# Patient Record
Sex: FEMALE | Race: WHITE | Employment: OTHER | URBAN - NONMETROPOLITAN AREA
[De-identification: names, ages, dates, MRNs, and addresses within clinical notes are randomized per-mention and may not be internally consistent; named-entity substitution may affect disease eponyms.]

---

## 2017-03-03 ENCOUNTER — OFFICE VISIT - GICH (OUTPATIENT)
Dept: FAMILY MEDICINE | Facility: OTHER | Age: 72
End: 2017-03-03

## 2017-03-03 DIAGNOSIS — M70.62 TROCHANTERIC BURSITIS OF LEFT HIP: ICD-10-CM

## 2018-01-03 NOTE — NURSING NOTE
Patient Information     Patient Name MRN Sex Soraida Tamayo [id number] Female 1945      Nursing Note by An Crawford at 3/3/2017  1:15 PM     Author:  An Crawford Service:  (none) Author Type:  (none)     Filed:  3/3/2017  2:21 PM Encounter Date:  3/3/2017 Status:  Signed     :  An Crawford) hip pain X 1 month  An Crawford LPN........................3/3/2017  1:21 PM

## 2018-01-03 NOTE — PROGRESS NOTES
Patient Information     Patient Name MRN Sex Soraida Tamayo [id number] Female 1945      Progress Notes by Eric Balderas MD at 3/3/2017  1:15 PM     Author:  Eric Balderas MD Service:  (none) Author Type:  Physician     Filed:  3/3/2017  2:29 PM Encounter Date:  3/3/2017 Status:  Signed     :  Eric Balderas MD (Physician)            SUBJECTIVE:  Soraida Lynn is a 71 y.o. female here for left hip pain.  This is the first time I've seen this patient for this concern. Patient reports that she has had left hip pain over the last month. She does not recall any particular injury or activity that brought this on. She has a history of right foot pain and has been seen by podiatry for this. She states that she has pain when she is going up stairs and when she moves her leg in certain movements. She has tried Aleve occasionally at night with some relief.    ROS:    As above otherwise ROS is unremarkable.    OBJECTIVE:  /70  Pulse 72  Wt 72.6 kg (160 lb)  Breastfeeding? No  BMI 25.82 kg/m2    EXAM:  General Appearance: Pleasant, alert, appropriate appearance for age. No acute distress  Musculoskeletal: Left hip shows flexion of 120 , internal rotation 10 , external rotation 45 . She has tenderness over her greater trochanter. No SI joint tenderness. No pain with log rolling.  Skin: No rashes.    ASSESSEMENT AND PLAN:    Soraida was seen today for hip pain/problem.    Diagnoses and all orders for this visit:    Trochanteric bursitis, left hip  -     SD DRAIN/INJ MAJOR JOINT/BURSA HIP KNEE WO US GUIDE  -     triamcinolone acetonide 40 mg injection (KENALOG); Inject 1 mL intra-articular one time.    Her symptoms are consistent with trochanteric bursitis and possible mild IT band syndrome. Discussed her various options including daily anti-inflammatories over the next couple of weeks, physical therapy, steroid injections. She states that she has a [employer] tournament coming up soon and she would like to  have a steroid injection. Informed consent was obtained. Her left greater trochanter was cleansed normal fashion. A 5 mL mixture of 40 mg of Kenalog and lidocaine used to infiltrate the point of maximal tenderness in a fanlike fashion. She tolerated this well, no immediate Complications. She'll ice this area to prevent postinjection flare and follow-up as needed.      Jerry Balderas MD    This document was prepared using voice generated softwear.  While every attempt was made for accuracy, grammatical errors may exist.

## 2018-01-26 VITALS
WEIGHT: 160 LBS | DIASTOLIC BLOOD PRESSURE: 70 MMHG | BODY MASS INDEX: 25.82 KG/M2 | SYSTOLIC BLOOD PRESSURE: 112 MMHG | HEART RATE: 72 BPM

## 2018-02-02 ENCOUNTER — TRANSFERRED RECORDS (OUTPATIENT)
Dept: HEALTH INFORMATION MANAGEMENT | Facility: OTHER | Age: 73
End: 2018-02-02

## 2018-10-05 ENCOUNTER — TELEPHONE (OUTPATIENT)
Dept: FAMILY MEDICINE | Facility: OTHER | Age: 73
End: 2018-10-05

## 2018-10-05 ENCOUNTER — ALLIED HEALTH/NURSE VISIT (OUTPATIENT)
Dept: FAMILY MEDICINE | Facility: OTHER | Age: 73
End: 2018-10-05
Attending: INTERNAL MEDICINE
Payer: COMMERCIAL

## 2018-10-05 DIAGNOSIS — Z23 NEED FOR PROPHYLACTIC VACCINATION AND INOCULATION AGAINST INFLUENZA: Primary | ICD-10-CM

## 2018-10-05 PROCEDURE — 90662 IIV NO PRSV INCREASED AG IM: CPT

## 2018-10-05 PROCEDURE — G0008 ADMIN INFLUENZA VIRUS VAC: HCPCS

## 2018-10-05 PROCEDURE — 90471 IMMUNIZATION ADMIN: CPT

## 2018-10-05 NOTE — PROGRESS NOTES

## 2018-10-05 NOTE — LETTER
[address]   [address]            2018      Soraida Lynn  [address] [facility] [address]              Dear Ms. Lynn,      Thank you for your interest in becoming a [employer] user.     Please access the [employer] web site at [email or web address].     Your access code is:  [email or web address] - for Proxy Use  Activation Code Expiration: 1/3/2019 10:51 AM     If you allow your access code to , or if you have any questions please call the [employer] representative at your primary clinic during normal clinic hours.     Sincerely,  [facility]

## 2018-10-05 NOTE — MR AVS SNAPSHOT
"              After Visit Summary   10/5/2018    Soraida Lynn    MRN: [id number]           Patient Information     Date Of Birth          1945        Visit Information        Provider Department      10/5/2018 10:10 AM GH FLU [facility]        Today's Diagnoses     Need for prophylactic vaccination and inoculation against influenza    -  1       Follow-ups after your visit        Who to contact     If you have questions or need follow up information about today's clinic visit or your schedule please contact [facility] directly at [phone].  Normal or non-critical lab and imaging results will be communicated to you by Selectronhart, letter or phone within 4 business days after the clinic has received the results. If you do not hear from us within 7 days, please contact the clinic through Obviousideat or phone. If you have a critical or abnormal lab result, we will notify you by phone as soon as possible.  Submit refill requests through Verastem or call your pharmacy and they will forward the refill request to us. Please allow 3 business days for your refill to be completed.          Additional Information About Your Visit        MyChart Information     [employer] lets you send messages to your doctor, view your test results, renew your prescriptions, schedule appointments and more. To sign up, go to [email or web address] . Click on \"Log in\" on the left side of the screen, which will take you to the Welcome page. Then click on \"Sign up Now\" on the right side of the page.     You will be asked to enter the access code listed below, as well as some personal information. Please follow the directions to create your username and password.     Your access code is: [email or web address]  Expires: 1/3/2019 10:51 AM     Your access code will  in 90 days. If you need help or a new code, please call your [facility] or [phone].        Care EveryWhere ID     This is your Care " EveryWhere ID. This could be used by other organizations to access your [facility] medical records  [id number]         Blood Pressure from Last 3 Encounters:   03/03/17 112/70   10/11/16 126/68   12/29/15 116/70    Weight from Last 3 Encounters:   03/03/17 160 lb (72.6 kg)   10/11/16 160 lb (72.6 kg)   12/29/15 156 lb (70.8 kg)              We Performed the Following     HC FLU VACCINE, INCREASED ANTIGEN, PRESV FREE        Primary Care Provider Office Phone # Fax #    Eric Balderas [phone]       [address] [facility] [address]        Equal Access to Services     ROBERT SAMPSON : Hadkrystle Pedro, luiz brooks, qajohana kaalmajose enil, neelima aldridge . So [facility] [phone].    ATENCIÓN: Si habla español, tiene a salazar disposición servicios gratuitos de asistencia lingüística. Llame al [phone].    We comply with applicable federal civil rights laws and [place] laws. We do not discriminate on the basis of race, color, national origin, age, disability, sex, sexual orientation, or gender identity.            Thank you!     Thank you for choosing [facility] AND [facility]  for your care. Our goal is always to provide you with excellent care. Hearing back from our patients is one way we can continue to improve our services. Please take a few minutes to complete the written survey that you may receive in the mail after your visit with us. Thank you!             Your Updated Medication List - Protect others around you: Learn how to safely use, store and throw away your medicines at www.disposemymeds.org.      Notice  As of 10/5/2018  1:27 PM    You have not been prescribed any medications.

## 2018-10-24 ENCOUNTER — HOSPITAL ENCOUNTER (OUTPATIENT)
Dept: ULTRASOUND IMAGING | Facility: OTHER | Age: 73
Discharge: HOME | End: 2018-10-24
Attending: NURSE PRACTITIONER | Admitting: NURSE PRACTITIONER
Payer: COMMERCIAL

## 2018-10-24 ENCOUNTER — TRANSFERRED RECORDS (OUTPATIENT)
Dept: HEALTH INFORMATION MANAGEMENT | Facility: OTHER | Age: 73
End: 2018-10-24

## 2018-10-24 DIAGNOSIS — R60.9 SWELLING: ICD-10-CM

## 2018-10-24 DIAGNOSIS — M79.662 PAIN OF LEFT CALF: ICD-10-CM

## 2018-10-24 PROCEDURE — 93971 EXTREMITY STUDY: CPT | Mod: LT

## 2018-10-26 ENCOUNTER — TRANSFERRED RECORDS (OUTPATIENT)
Dept: HEALTH INFORMATION MANAGEMENT | Facility: OTHER | Age: 73
End: 2018-10-26

## 2018-11-09 ENCOUNTER — TRANSFERRED RECORDS (OUTPATIENT)
Dept: HEALTH INFORMATION MANAGEMENT | Facility: OTHER | Age: 73
End: 2018-11-09

## 2018-11-13 ENCOUNTER — HOSPITAL ENCOUNTER (OUTPATIENT)
Dept: MRI IMAGING | Facility: OTHER | Age: 73
Discharge: HOME | End: 2018-11-13
Attending: NURSE PRACTITIONER | Admitting: NURSE PRACTITIONER
Payer: COMMERCIAL

## 2018-11-13 DIAGNOSIS — M25.562 LEFT KNEE PAIN: ICD-10-CM

## 2018-11-13 PROCEDURE — 73721 MRI JNT OF LWR EXTRE W/O DYE: CPT | Mod: LT

## 2019-04-01 ENCOUNTER — DOCUMENTATION ONLY (OUTPATIENT)
Dept: OTHER | Facility: CLINIC | Age: 74
End: 2019-04-01

## 2019-10-23 ENCOUNTER — ALLIED HEALTH/NURSE VISIT (OUTPATIENT)
Dept: FAMILY MEDICINE | Facility: OTHER | Age: 74
End: 2019-10-23
Attending: FAMILY MEDICINE
Payer: COMMERCIAL

## 2019-10-23 DIAGNOSIS — Z23 NEED FOR PROPHYLACTIC VACCINATION AND INOCULATION AGAINST INFLUENZA: Primary | ICD-10-CM

## 2019-10-23 PROCEDURE — 90662 IIV NO PRSV INCREASED AG IM: CPT

## 2019-10-23 PROCEDURE — G0008 ADMIN INFLUENZA VIRUS VAC: HCPCS

## 2019-10-23 PROCEDURE — 90471 IMMUNIZATION ADMIN: CPT

## 2019-10-23 NOTE — NURSING NOTE
Immunization Documentation    Prior to Immunization administration, verified patients identity using patient's name and date of birth. Please see IMMUNIZATIONS  and order for additional information.  Patient / Parent instructed to remain in clinic for 15 minutes and report any adverse reaction to staff immediately.    Was entire vial of medication used? Yes  Vial/Syringe: Aisha Katz LPN  10/23/2019   4:15 PM

## 2019-12-12 ENCOUNTER — TRANSFERRED RECORDS (OUTPATIENT)
Dept: HEALTH INFORMATION MANAGEMENT | Facility: HOSPITAL | Age: 74
End: 2019-12-12

## 2020-02-03 ENCOUNTER — OFFICE VISIT (OUTPATIENT)
Dept: FAMILY MEDICINE | Facility: OTHER | Age: 75
End: 2020-02-03
Attending: FAMILY MEDICINE
Payer: COMMERCIAL

## 2020-02-03 VITALS
BODY MASS INDEX: 24.53 KG/M2 | DIASTOLIC BLOOD PRESSURE: 62 MMHG | HEART RATE: 74 BPM | OXYGEN SATURATION: 97 % | HEIGHT: 66 IN | RESPIRATION RATE: 20 BRPM | TEMPERATURE: 98.4 F | WEIGHT: 152.6 LBS | SYSTOLIC BLOOD PRESSURE: 110 MMHG

## 2020-02-03 DIAGNOSIS — F17.210 CIGARETTE NICOTINE DEPENDENCE WITHOUT COMPLICATION: ICD-10-CM

## 2020-02-03 DIAGNOSIS — Z01.818 PRE-OPERATIVE GENERAL PHYSICAL EXAMINATION: Primary | ICD-10-CM

## 2020-02-03 DIAGNOSIS — Z83.49 FAMILY HISTORY OF THYROID DYSFUNCTION: ICD-10-CM

## 2020-02-03 DIAGNOSIS — Z13.220 SCREENING CHOLESTEROL LEVEL: ICD-10-CM

## 2020-02-03 LAB
ALBUMIN SERPL-MCNC: 4.1 G/DL (ref 3.5–5.7)
ALP SERPL-CCNC: 55 U/L (ref 34–104)
ALT SERPL W P-5'-P-CCNC: 13 U/L (ref 7–52)
ANION GAP SERPL CALCULATED.3IONS-SCNC: 7 MMOL/L (ref 3–14)
AST SERPL W P-5'-P-CCNC: 14 U/L (ref 13–39)
BILIRUB SERPL-MCNC: 0.4 MG/DL (ref 0.3–1)
BUN SERPL-MCNC: 15 MG/DL (ref 7–25)
CALCIUM SERPL-MCNC: 9.3 MG/DL (ref 8.6–10.3)
CHLORIDE SERPL-SCNC: 101 MMOL/L (ref 98–107)
CHOLEST SERPL-MCNC: 297 MG/DL
CO2 SERPL-SCNC: 30 MMOL/L (ref 21–31)
CREAT SERPL-MCNC: 0.89 MG/DL (ref 0.6–1.2)
GFR SERPL CREATININE-BSD FRML MDRD: 62 ML/MIN/{1.73_M2}
GLUCOSE SERPL-MCNC: 109 MG/DL (ref 70–105)
HDLC SERPL-MCNC: 47 MG/DL (ref 23–92)
LDLC SERPL CALC-MCNC: 188 MG/DL
NONHDLC SERPL-MCNC: 250 MG/DL
POTASSIUM SERPL-SCNC: 4.4 MMOL/L (ref 3.5–5.1)
PROT SERPL-MCNC: 6.8 G/DL (ref 6.4–8.9)
SODIUM SERPL-SCNC: 138 MMOL/L (ref 134–144)
TRIGL SERPL-MCNC: 310 MG/DL
TSH SERPL DL<=0.05 MIU/L-ACNC: 0.56 IU/ML (ref 0.34–5.6)

## 2020-02-03 PROCEDURE — 90670 PCV13 VACCINE IM: CPT

## 2020-02-03 PROCEDURE — G0463 HOSPITAL OUTPT CLINIC VISIT: HCPCS | Mod: 25

## 2020-02-03 PROCEDURE — 80053 COMPREHEN METABOLIC PANEL: CPT | Mod: ZL | Performed by: FAMILY MEDICINE

## 2020-02-03 PROCEDURE — G0463 HOSPITAL OUTPT CLINIC VISIT: HCPCS

## 2020-02-03 PROCEDURE — 36415 COLL VENOUS BLD VENIPUNCTURE: CPT | Mod: ZL | Performed by: FAMILY MEDICINE

## 2020-02-03 PROCEDURE — 99214 OFFICE O/P EST MOD 30 MIN: CPT | Performed by: FAMILY MEDICINE

## 2020-02-03 PROCEDURE — 80061 LIPID PANEL: CPT | Mod: ZL | Performed by: FAMILY MEDICINE

## 2020-02-03 PROCEDURE — 84443 ASSAY THYROID STIM HORMONE: CPT | Mod: ZL,GZ | Performed by: FAMILY MEDICINE

## 2020-02-03 PROCEDURE — G0009 ADMIN PNEUMOCOCCAL VACCINE: HCPCS

## 2020-02-03 RX ORDER — MULTIVIT WITH MINERALS/LUTEIN
1 TABLET ORAL DAILY
COMMUNITY

## 2020-02-03 ASSESSMENT — MIFFLIN-ST. JEOR: SCORE: 1198.94

## 2020-02-03 ASSESSMENT — PAIN SCALES - GENERAL: PAINLEVEL: NO PAIN (0)

## 2020-02-03 ASSESSMENT — PATIENT HEALTH QUESTIONNAIRE - PHQ9: SUM OF ALL RESPONSES TO PHQ QUESTIONS 1-9: 0

## 2020-02-03 NOTE — NURSING NOTE
"Patient presents today for pre op exam.  Date of Surgery: 02/24/2020    Type of Surgery: Right Cataract Surgery  Surgeon: Dr. Black  Hospital:  [facility]  Fax:     Fever/Chills or other infectious symptoms in past month: no  >10lb weight loss in past two months: no  O2 SAT: 97    Health Care Directive/Code status:  yes  Hx of blood transfusions:   no  Td up to date:  yes  History of VRE/MRSA:  no Date:      Preoperative Evaluation: Obstructive Sleep Apnea screening    S: Snore -  Do you snore loudly? (louder than talking or loud enough to be heard through closed doors)yes  T: Tired - Do you often feel tired, fatigued, or sleepy during the daytime?no  O: Observed - Has anyone ever observed you stop breathing during your sleep?yes  P: Pressure - Do you have or are you being treated for high blood pressure?no  B: BMI - BMI greater than 35kg/m2?no  A: Age - Age over 50 years old?yes  N: Neck - Neck circumference greater than 40 cm?no  G: Gender - Gender: Male?no    Total number of \"YES\" responses:  3    Scoring: Low risk of JOHANNA 0-2  At Risk of JOHANNA: >3 High Risk of JOHANNA: 5-8    Deandra Wan LPN 2/3/2020 10:58 AM    Medication Reconciliation Complete    Deandra Wan LPN  2/3/2020 10:58 AM  "

## 2020-02-03 NOTE — H&P (VIEW-ONLY)
SUBJECTIVE:  Soraida Lynn is a 74 year old female here for preop.  She is planning on having a right cataract removed.  She is had a left cataract removed previously without any difficulty.    She continues to smoke daily essentially quitting.    She takes a Immanuel back and body each morning and Aleve PM at night.  She also takes a Centrum Silver.  She otherwise has no new concerns today.      Patient Active Problem List    Diagnosis Date Noted     Seborrheic keratosis 07/12/2011     Priority: Medium     Nicotine addiction 11/29/2010     Priority: Medium       Past Medical History:   Diagnosis Date     Closed fracture of skull (H)     Fractured skull as a child       Past Surgical History:   Procedure Laterality Date     ARTHROSCOPY KNEE Right 06/18/2014    meniscus tear     ARTHROSCOPY SHOULDER ROTATOR CUFF REPAIR Right 03/2012    arthroscopy, rotator cuff repair, SLAP lesion     EXTRACAPSULAR CATARACT EXTRATION WITH INTRAOCULAR LENS IMPLANT  2010    planned left cataract removal, Dr. Blcak     WRIST SURGERY Right 07/2011    Right wrist cystectomy - Dr Aguilar       Current Outpatient Medications   Medication Sig Dispense Refill     Aspirin-Caffeine (IMMANUEL BACK & BODY) 500-32.5 MG TABS        Ibuprofen-diphenhydrAMINE Cit (ADVIL PM) 200-38 MG TABS Take 2 tablets by mouth       multivitamin (CENTRUM SILVER) tablet Take 1 tablet by mouth daily         Allergies:  No Known Allergies    Family History   Problem Relation Age of Onset     Pancreatic Cancer Mother         Cancer-pancreatic     Other - See Comments Father         AAA/high blood pressure     Cancer Father         Cancer,skin cancer on his nose     Heart Disease Brother         Heart Disease,afib       Social History     Tobacco Use     Smoking status: Current Every Day Smoker     Packs/day: 1.00     Types: Cigarettes     Smokeless tobacco: Never Used   Substance Use Topics     Alcohol use: Yes     Drug use: Never       ROS:    As above otherwise ROS is  "unremarkable.      OBJECTIVE:  /62   Pulse 74   Temp 98.4  F (36.9  C)   Resp 20   Ht 1.676 m (5' 6\")   Wt 69.2 kg (152 lb 9.6 oz)   SpO2 97%   BMI 24.63 kg/m      EXAM:  General Appearance: Pleasant, alert, appropriate appearance for age. No acute distress  Head: Normal. Normocephalic, atraumatic.  Eyes: PERRL, EOMI  Ears: Normal TM's bilaterally. Normal auditory canals and external ears.   OroPharynx: Upper and lower dentures.  Normal buccal mucosa. Normal pharynx.  Neck: Supple, no masses or nodes, no lymphadenopathy.  No thyromegaly.  Lungs: Normal chest wall and respirations. Clear to auscultation, no wheezes or crackles.  Cardiovascular: Regular rate and rhythm. S1, S2, no murmurs.  Gastrointestinal: Soft, nontender, no abnormal masses or organomegaly. BS normal.  Musculoskeletal: No edema.  Skin: no concerning or new rashes.  Numerous seborrheic keratoses are seen once again.  Neurologic Exam: CN 2-12 grossly intact.  Normal gait.   Psychiatric Exam: Alert and oriented, appropriate affect.    ASSESSEMENT AND PLAN:    1. Pre-operative general physical examination    2. Cigarette nicotine dependence without complication    3. Screening cholesterol level      She is cleared for upcoming procedure.  She will stop her aspirin and ibuprofen 1 week prior to her procedure.  She is otherwise optimized.    We will get labs today, she is not fasting.    We will get Prevnar today.    Jerry Balderas MD  Family Medicine      This document was prepared using voice generated software.  While every attempt was made for accuracy, grammatical errors may exist.  "

## 2020-02-03 NOTE — PROGRESS NOTES
SUBJECTIVE:  Soraida Lynn is a 74 year old female here for preop.  She is planning on having a right cataract removed.  She is had a left cataract removed previously without any difficulty.    She continues to smoke daily essentially quitting.    She takes a Immanuel back and body each morning and Aleve PM at night.  She also takes a Centrum Silver.  She otherwise has no new concerns today.      Patient Active Problem List    Diagnosis Date Noted     Seborrheic keratosis 07/12/2011     Priority: Medium     Nicotine addiction 11/29/2010     Priority: Medium       Past Medical History:   Diagnosis Date     Closed fracture of skull (H)     Fractured skull as a child       Past Surgical History:   Procedure Laterality Date     ARTHROSCOPY KNEE Right 06/18/2014    meniscus tear     ARTHROSCOPY SHOULDER ROTATOR CUFF REPAIR Right 03/2012    arthroscopy, rotator cuff repair, SLAP lesion     EXTRACAPSULAR CATARACT EXTRATION WITH INTRAOCULAR LENS IMPLANT  2010    planned left cataract removal, Dr. Black     WRIST SURGERY Right 07/2011    Right wrist cystectomy - Dr Aguilar       Current Outpatient Medications   Medication Sig Dispense Refill     Aspirin-Caffeine (IMMANUEL BACK & BODY) 500-32.5 MG TABS        Ibuprofen-diphenhydrAMINE Cit (ADVIL PM) 200-38 MG TABS Take 2 tablets by mouth       multivitamin (CENTRUM SILVER) tablet Take 1 tablet by mouth daily         Allergies:  No Known Allergies    Family History   Problem Relation Age of Onset     Pancreatic Cancer Mother         Cancer-pancreatic     Other - See Comments Father         AAA/high blood pressure     Cancer Father         Cancer,skin cancer on his nose     Heart Disease Brother         Heart Disease,afib       Social History     Tobacco Use     Smoking status: Current Every Day Smoker     Packs/day: 1.00     Types: Cigarettes     Smokeless tobacco: Never Used   Substance Use Topics     Alcohol use: Yes     Drug use: Never       ROS:    As above otherwise ROS is  "unremarkable.      OBJECTIVE:  /62   Pulse 74   Temp 98.4  F (36.9  C)   Resp 20   Ht 1.676 m (5' 6\")   Wt 69.2 kg (152 lb 9.6 oz)   SpO2 97%   BMI 24.63 kg/m      EXAM:  General Appearance: Pleasant, alert, appropriate appearance for age. No acute distress  Head: Normal. Normocephalic, atraumatic.  Eyes: PERRL, EOMI  Ears: Normal TM's bilaterally. Normal auditory canals and external ears.   OroPharynx: Upper and lower dentures.  Normal buccal mucosa. Normal pharynx.  Neck: Supple, no masses or nodes, no lymphadenopathy.  No thyromegaly.  Lungs: Normal chest wall and respirations. Clear to auscultation, no wheezes or crackles.  Cardiovascular: Regular rate and rhythm. S1, S2, no murmurs.  Gastrointestinal: Soft, nontender, no abnormal masses or organomegaly. BS normal.  Musculoskeletal: No edema.  Skin: no concerning or new rashes.  Numerous seborrheic keratoses are seen once again.  Neurologic Exam: CN 2-12 grossly intact.  Normal gait.   Psychiatric Exam: Alert and oriented, appropriate affect.    ASSESSEMENT AND PLAN:    1. Pre-operative general physical examination    2. Cigarette nicotine dependence without complication    3. Screening cholesterol level      She is cleared for upcoming procedure.  She will stop her aspirin and ibuprofen 1 week prior to her procedure.  She is otherwise optimized.    We will get labs today, she is not fasting.    We will get Prevnar today.    Jerry Balderas MD  Family Medicine      This document was prepared using voice generated software.  While every attempt was made for accuracy, grammatical errors may exist.  "

## 2020-02-17 ENCOUNTER — ANESTHESIA EVENT (OUTPATIENT)
Dept: SURGERY | Facility: HOSPITAL | Age: 75
End: 2020-02-17
Payer: COMMERCIAL

## 2020-02-17 ASSESSMENT — LIFESTYLE VARIABLES: TOBACCO_USE: 1

## 2020-02-17 NOTE — ANESTHESIA PREPROCEDURE EVALUATION
Anesthesia Pre-Procedure Evaluation    Patient: Soraida Lynn   MRN: [id number] : 1945          Preoperative Diagnosis: Combined forms of age-related cataract of right eye [H25.811]    Procedure(s):  PHACOEMULSIFICATION CATARACT EXTRACTION POSTERIOR CHAMBER LENS RIGHT EYE    Past Medical History:   Diagnosis Date     Closed fracture of skull (H)     Fractured skull as a child     Past Surgical History:   Procedure Laterality Date     ARTHROSCOPY KNEE Right 2014    meniscus tear     ARTHROSCOPY SHOULDER ROTATOR CUFF REPAIR Right 2012    arthroscopy, rotator cuff repair, SLAP lesion     EXTRACAPSULAR CATARACT EXTRATION WITH INTRAOCULAR LENS IMPLANT      planned left cataract removal, Dr. Black     WRIST SURGERY Right 2011    Right wrist cystectomy - Dr Aguilar       Anesthesia Evaluation     . Pt has had prior anesthetic. Type: General and MAC    No history of anesthetic complications          ROS/MED HX    ENT/Pulmonary:     (+)tobacco use, Current use 1 packs/day  , . .    Neurologic:  - neg neurologic ROS     Cardiovascular:     (+) Dyslipidemia, ----. : . . . :. .       METS/Exercise Tolerance:     Hematologic:  - neg hematologic  ROS       Musculoskeletal:   (+) arthritis,  -       GI/Hepatic:  - neg GI/hepatic ROS       Renal/Genitourinary:  - ROS Renal section negative       Endo:  - neg endo ROS       Psychiatric:  - neg psychiatric ROS       Infectious Disease:  - neg infectious disease ROS      (-) HIV   Malignancy:      - no malignancy   Other:    - neg other ROS                      Physical Exam  Normal systems: cardiovascular and pulmonary    Airway   Mallampati: II  TM distance: >3 FB  Neck ROM: full    Dental   (+) upper dentures and lower dentures    Cardiovascular   Rhythm and rate: regular and normal      Pulmonary    breath sounds clear to auscultation            Lab Results   Component Value Date    HGB 15.0 2014    HCT 44.7 2013  "    02/18/2013    SED 13 02/18/2013     02/03/2020    POTASSIUM 4.4 02/03/2020    CHLORIDE 101 02/03/2020    CO2 30 02/03/2020    BUN 15 02/03/2020    CR 0.89 02/03/2020     (H) 02/03/2020    NIRAV 9.3 02/03/2020    ALBUMIN 4.1 02/03/2020    PROTTOTAL 6.8 02/03/2020    ALT 13 02/03/2020    AST 14 02/03/2020    ALKPHOS 55 02/03/2020    BILITOTAL 0.4 02/03/2020       Preop Vitals  BP Readings from Last 3 Encounters:   02/03/20 110/62   03/03/17 112/70   10/11/16 126/68    Pulse Readings from Last 3 Encounters:   02/03/20 74   03/03/17 72   10/11/16 72      Resp Readings from Last 3 Encounters:   02/03/20 20    SpO2 Readings from Last 3 Encounters:   02/03/20 97%      Temp Readings from Last 1 Encounters:   02/03/20 98.4  F (36.9  C)    Ht Readings from Last 1 Encounters:   02/03/20 1.676 m (5' 6\")      Wt Readings from Last 1 Encounters:   02/03/20 69.2 kg (152 lb 9.6 oz)    Estimated body mass index is 24.63 kg/m  as calculated from the following:    Height as of 2/3/20: 1.676 m (5' 6\").    Weight as of 2/3/20: 69.2 kg (152 lb 9.6 oz).       Anesthesia Plan      History & Physical Review  History and physical reviewed and following examination; no interval change.    ASA Status:  2 .    NPO Status:  > 8 hours    Plan for MAC and Other with Other induction. Maintenance will be Other.  Reason for MAC:  Procedure to face, neck, head or breast    Hp 2/3/20        Postoperative Care      Consents  Anesthetic plan, risks, benefits and alternatives discussed with:  Patient.  Use of blood products discussed: No .   .                 SHIRIN Antonio CRNA  "

## 2020-02-24 ENCOUNTER — HOSPITAL ENCOUNTER (OUTPATIENT)
Facility: HOSPITAL | Age: 75
Discharge: HOME | End: 2020-02-24
Attending: OPHTHALMOLOGY | Admitting: OPHTHALMOLOGY
Payer: COMMERCIAL

## 2020-02-24 ENCOUNTER — ANESTHESIA (OUTPATIENT)
Dept: SURGERY | Facility: HOSPITAL | Age: 75
End: 2020-02-24
Payer: COMMERCIAL

## 2020-02-24 VITALS
OXYGEN SATURATION: 97 % | RESPIRATION RATE: 16 BRPM | DIASTOLIC BLOOD PRESSURE: 66 MMHG | SYSTOLIC BLOOD PRESSURE: 115 MMHG | TEMPERATURE: 98.4 F | HEART RATE: 73 BPM

## 2020-02-24 PROCEDURE — V2632 POST CHMBR INTRAOCULAR LENS: HCPCS | Performed by: OPHTHALMOLOGY

## 2020-02-24 PROCEDURE — 25000125 ZZHC RX 250: Performed by: OPHTHALMOLOGY

## 2020-02-24 PROCEDURE — 25000132 ZZH RX MED GY IP 250 OP 250 PS 637: Performed by: OPHTHALMOLOGY

## 2020-02-24 PROCEDURE — 27210794 ZZH OR GENERAL SUPPLY STERILE: Performed by: OPHTHALMOLOGY

## 2020-02-24 PROCEDURE — 36000056 ZZH SURGERY LEVEL 3 1ST 30 MIN: Performed by: OPHTHALMOLOGY

## 2020-02-24 PROCEDURE — 66984 XCAPSL CTRC RMVL W/O ECP: CPT | Performed by: NURSE ANESTHETIST, CERTIFIED REGISTERED

## 2020-02-24 PROCEDURE — 40000306 ZZH STATISTIC PRE PROC ASSESS II: Performed by: OPHTHALMOLOGY

## 2020-02-24 PROCEDURE — 99100 ANES PT EXTEME AGE<1 YR&>70: CPT | Performed by: NURSE ANESTHETIST, CERTIFIED REGISTERED

## 2020-02-24 PROCEDURE — 25000128 H RX IP 250 OP 636: Performed by: OPHTHALMOLOGY

## 2020-02-24 PROCEDURE — 37000008 ZZH ANESTHESIA TECHNICAL FEE, 1ST 30 MIN: Performed by: OPHTHALMOLOGY

## 2020-02-24 PROCEDURE — 71000027 ZZH RECOVERY PHASE 2 EACH 15 MINS: Performed by: OPHTHALMOLOGY

## 2020-02-24 DEVICE — LENS-SOFPORT 17.5: Type: IMPLANTABLE DEVICE | Site: EYE | Status: FUNCTIONAL

## 2020-02-24 RX ORDER — MOXIFLOXACIN 5 MG/ML
SOLUTION/ DROPS OPHTHALMIC PRN
Status: DISCONTINUED | OUTPATIENT
Start: 2020-02-24 | End: 2020-02-24 | Stop reason: HOSPADM

## 2020-02-24 RX ORDER — ONDANSETRON 4 MG/1
4 TABLET, ORALLY DISINTEGRATING ORAL EVERY 30 MIN PRN
Status: DISCONTINUED | OUTPATIENT
Start: 2020-02-24 | End: 2020-02-24 | Stop reason: HOSPADM

## 2020-02-24 RX ORDER — LIDOCAINE HYDROCHLORIDE 10 MG/ML
INJECTION, SOLUTION EPIDURAL; INFILTRATION; INTRACAUDAL; PERINEURAL PRN
Status: DISCONTINUED | OUTPATIENT
Start: 2020-02-24 | End: 2020-02-24 | Stop reason: HOSPADM

## 2020-02-24 RX ORDER — MEPERIDINE HYDROCHLORIDE 50 MG/ML
12.5 INJECTION INTRAMUSCULAR; INTRAVENOUS; SUBCUTANEOUS
Status: DISCONTINUED | OUTPATIENT
Start: 2020-02-24 | End: 2020-02-24 | Stop reason: HOSPADM

## 2020-02-24 RX ORDER — NALOXONE HYDROCHLORIDE 0.4 MG/ML
.1-.4 INJECTION, SOLUTION INTRAMUSCULAR; INTRAVENOUS; SUBCUTANEOUS
Status: DISCONTINUED | OUTPATIENT
Start: 2020-02-24 | End: 2020-02-24 | Stop reason: HOSPADM

## 2020-02-24 RX ORDER — CYCLOPENTOLATE HYDROCHLORIDE 20 MG/ML
1 SOLUTION/ DROPS OPHTHALMIC
Status: COMPLETED | OUTPATIENT
Start: 2020-02-24 | End: 2020-02-24

## 2020-02-24 RX ORDER — PHENYLEPHRINE HYDROCHLORIDE 25 MG/ML
1 SOLUTION/ DROPS OPHTHALMIC
Status: COMPLETED | OUTPATIENT
Start: 2020-02-24 | End: 2020-02-24

## 2020-02-24 RX ORDER — PROPARACAINE HYDROCHLORIDE 5 MG/ML
1 SOLUTION/ DROPS OPHTHALMIC ONCE
Status: COMPLETED | OUTPATIENT
Start: 2020-02-24 | End: 2020-02-24

## 2020-02-24 RX ORDER — PILOCARPINE HYDROCHLORIDE 10 MG/ML
SOLUTION/ DROPS OPHTHALMIC PRN
Status: DISCONTINUED | OUTPATIENT
Start: 2020-02-24 | End: 2020-02-24 | Stop reason: HOSPADM

## 2020-02-24 RX ORDER — ACETAMINOPHEN 325 MG/1
650 TABLET ORAL ONCE
Status: COMPLETED | OUTPATIENT
Start: 2020-02-24 | End: 2020-02-24

## 2020-02-24 RX ORDER — TETRACAINE HYDROCHLORIDE 5 MG/ML
SOLUTION OPHTHALMIC PRN
Status: DISCONTINUED | OUTPATIENT
Start: 2020-02-24 | End: 2020-02-24 | Stop reason: HOSPADM

## 2020-02-24 RX ORDER — SODIUM CHLORIDE, SODIUM LACTATE, POTASSIUM CHLORIDE, CALCIUM CHLORIDE 600; 310; 30; 20 MG/100ML; MG/100ML; MG/100ML; MG/100ML
INJECTION, SOLUTION INTRAVENOUS CONTINUOUS
Status: DISCONTINUED | OUTPATIENT
Start: 2020-02-24 | End: 2020-02-24 | Stop reason: HOSPADM

## 2020-02-24 RX ORDER — ONDANSETRON 2 MG/ML
4 INJECTION INTRAMUSCULAR; INTRAVENOUS EVERY 30 MIN PRN
Status: DISCONTINUED | OUTPATIENT
Start: 2020-02-24 | End: 2020-02-24 | Stop reason: HOSPADM

## 2020-02-24 RX ORDER — LEVOBUNOLOL HYDROCHLORIDE 5 MG/ML
SOLUTION/ DROPS OPHTHALMIC PRN
Status: DISCONTINUED | OUTPATIENT
Start: 2020-02-24 | End: 2020-02-24 | Stop reason: HOSPADM

## 2020-02-24 RX ORDER — PHENYLEPHRINE HYDROCHLORIDE 100 MG/ML
1 SOLUTION/ DROPS OPHTHALMIC
Status: DISCONTINUED | OUTPATIENT
Start: 2020-02-24 | End: 2020-02-24 | Stop reason: HOSPADM

## 2020-02-24 RX ORDER — LIDOCAINE 40 MG/G
CREAM TOPICAL
Status: DISCONTINUED | OUTPATIENT
Start: 2020-02-24 | End: 2020-02-24 | Stop reason: HOSPADM

## 2020-02-24 RX ADMIN — PROPARACAINE HYDROCHLORIDE 1 DROP: 5 SOLUTION/ DROPS OPHTHALMIC at 07:26

## 2020-02-24 RX ADMIN — PHENYLEPHRINE HYDROCHLORIDE 1 DROP: 25 SOLUTION/ DROPS OPHTHALMIC at 07:27

## 2020-02-24 RX ADMIN — CYCLOPENTOLATE HYDROCHLORIDE 1 DROP: 20 SOLUTION/ DROPS OPHTHALMIC at 07:32

## 2020-02-24 RX ADMIN — FLURBIPROFEN SODIUM 0.5 ML: 0.3 SOLUTION/ DROPS OPHTHALMIC at 07:33

## 2020-02-24 RX ADMIN — ACETAMINOPHEN 650 MG: 325 TABLET, FILM COATED ORAL at 07:21

## 2020-02-24 RX ADMIN — PHENYLEPHRINE HYDROCHLORIDE 1 DROP: 25 SOLUTION/ DROPS OPHTHALMIC at 07:33

## 2020-02-24 RX ADMIN — CYCLOPENTOLATE HYDROCHLORIDE 1 DROP: 20 SOLUTION/ DROPS OPHTHALMIC at 07:27

## 2020-02-24 NOTE — INTERVAL H&P NOTE
History and physical reviewed. Patient examined. No interval change in condition.  Jorge Alberto Black MD

## 2020-02-24 NOTE — OP NOTE
[facility]  Ophthalmology Full Operative Note    Pre-operative diagnosis: Combined forms of age-related cataract of right eye [H25.811]   Post-operative diagnosis Same   Procedure: Procedure(s):  PHACOEMULSIFICATION CATARACT EXTRACTION POSTERIOR CHAMBER LENS RIGHT EYE LI61AO 17.5   Surgeon: Jorge Alberto Black MD   Assistants(s):    Anesthesia: Combined MAC with Topical    Estimated blood loss: None    Total IV fluids: (See anesthesia record)   Specimens: None   Implants: 17.5 LI61AO   Findings:    Complications: None   Condition: Stable   Comments:       PROCEDURAL ANESTHESIA:     Topical/MAC.  Lidocaine 2% jelly topically and Lidocaine 1% preservative-free intracamerally.     PROCEDURE:  The patient was brought to the Operating Room and prepped and draped in a sterile manner.  A wire lid speculum was placed.  A paracentesis was created and 1% Lidocaine was instilled in the anterior chamber.  The anterior chamber was then filled with Amvisc viscoelastic.  A clear cornea temporal wound was created using a 2.8 mm keratome.  A cystotome was used to initiate a flap in the anterior capsule and a Utrata forceps was used to create a continuous tear capsulorhexis.  Hydrodissection was performed.  The phacoemulsification tip was inserted into the eye and the nucleus and epinucleus were removed using a divide and conquer technique.  The residual cortex was removed using the I/A handpiece.  The anterior chamber was then refilled with viscoelastic and the wound was enlarged with the keratome.  The intraocular lens, 17.5 diopter, Model LI61AO, was placed into the injector and injected into the capsular bag. It was checked to make sure that it was central and stable.  Residual viscoelastic was removed using the I/A.  The anterior chamber was refilled with BSS.  The wounds were hydrated with BSS and were noted to be watertight with no suture necessary.  Topical pilocarpine 1%, Betagan, and Vigamox was applied.  A hard  shield was placed.     The patient tolerated the procedure well and was sent to the Recovery Room in satisfactory condition.

## 2020-02-24 NOTE — ANESTHESIA CARE TRANSFER NOTE
Patient: Soraida Lynn    Procedure(s):  PHACOEMULSIFICATION CATARACT EXTRACTION POSTERIOR CHAMBER LENS RIGHT EYE LI61AO 17.5    Diagnosis: Combined forms of age-related cataract of right eye [H25.811]  Diagnosis Additional Information: No value filed.    Anesthesia Type:   MAC, Other     Note:  Airway :Room Air  Patient transferred to:Phase II  Handoff Report: Identifed the Patient, Identified the Reponsible Provider, Reviewed the pertinent medical history, Discussed the surgical course, Reviewed Intra-OP anesthesia mangement and issues during anesthesia, Set expectations for post-procedure period and Allowed opportunity for questions and acknowledgement of understanding      Vitals: (Last set prior to Anesthesia Care Transfer)    CRNA VITALS  2/24/2020 0834 - 2/24/2020 0906      2/24/2020             Resp Rate (set):  8                Electronically Signed By: SHIRIN Mejía CRNA  February 24, 2020  9:06 AM

## 2020-03-11 ENCOUNTER — HEALTH MAINTENANCE LETTER (OUTPATIENT)
Age: 75
End: 2020-03-11

## 2020-12-27 ENCOUNTER — HEALTH MAINTENANCE LETTER (OUTPATIENT)
Age: 75
End: 2020-12-27

## 2021-02-01 ENCOUNTER — MYC MEDICAL ADVICE (OUTPATIENT)
Dept: FAMILY MEDICINE | Facility: OTHER | Age: 76
End: 2021-02-01

## 2021-02-09 ENCOUNTER — IMMUNIZATION (OUTPATIENT)
Dept: FAMILY MEDICINE | Facility: OTHER | Age: 76
End: 2021-02-09
Attending: FAMILY MEDICINE
Payer: COMMERCIAL

## 2021-02-09 PROCEDURE — 91300 PR COVID VAC PFIZER DIL RECON 30 MCG/0.3 ML IM: CPT

## 2021-03-02 ENCOUNTER — IMMUNIZATION (OUTPATIENT)
Dept: FAMILY MEDICINE | Facility: OTHER | Age: 76
End: 2021-03-02
Attending: FAMILY MEDICINE
Payer: COMMERCIAL

## 2021-03-02 PROCEDURE — 91300 PR COVID VAC PFIZER DIL RECON 30 MCG/0.3 ML IM: CPT

## 2021-04-25 ENCOUNTER — HEALTH MAINTENANCE LETTER (OUTPATIENT)
Age: 76
End: 2021-04-25

## 2021-10-09 ENCOUNTER — HEALTH MAINTENANCE LETTER (OUTPATIENT)
Age: 76
End: 2021-10-09

## 2021-11-11 ENCOUNTER — HOSPITAL ENCOUNTER (EMERGENCY)
Facility: HOSPITAL | Age: 76
Discharge: HOME | End: 2021-11-11
Attending: PHYSICIAN ASSISTANT | Admitting: PHYSICIAN ASSISTANT
Payer: COMMERCIAL

## 2021-11-11 VITALS
TEMPERATURE: 99.4 F | DIASTOLIC BLOOD PRESSURE: 84 MMHG | OXYGEN SATURATION: 97 % | HEART RATE: 95 BPM | SYSTOLIC BLOOD PRESSURE: 147 MMHG | RESPIRATION RATE: 16 BRPM

## 2021-11-11 DIAGNOSIS — B02.31 HERPES ZOSTER CONJUNCTIVITIS: ICD-10-CM

## 2021-11-11 PROCEDURE — G0463 HOSPITAL OUTPT CLINIC VISIT: HCPCS

## 2021-11-11 PROCEDURE — 99213 OFFICE O/P EST LOW 20 MIN: CPT | Performed by: PHYSICIAN ASSISTANT

## 2021-11-11 RX ORDER — VALACYCLOVIR HYDROCHLORIDE 1 G/1
1000 TABLET, FILM COATED ORAL 3 TIMES DAILY
Qty: 21 TABLET | Refills: 0 | Status: SHIPPED | OUTPATIENT
Start: 2021-11-11 | End: 2021-11-18

## 2021-11-11 RX ORDER — SULFAMETHOXAZOLE/TRIMETHOPRIM 800-160 MG
1 TABLET ORAL 2 TIMES DAILY
Qty: 10 TABLET | Refills: 0 | Status: SHIPPED | OUTPATIENT
Start: 2021-11-11 | End: 2021-11-16

## 2021-11-11 ASSESSMENT — ENCOUNTER SYMPTOMS
CONSTITUTIONAL NEGATIVE: 1
STRIDOR: 0
FACIAL SWELLING: 1
HEADACHES: 0
WHEEZING: 0
RESPIRATORY NEGATIVE: 1
TROUBLE SWALLOWING: 0
FEVER: 0
CHOKING: 0
EYE DISCHARGE: 0
VOMITING: 0
PHOTOPHOBIA: 0
CHILLS: 0
COUGH: 0
NAUSEA: 0
MUSCULOSKELETAL NEGATIVE: 1
EYE ITCHING: 0
SORE THROAT: 0
EYE PAIN: 0
LIGHT-HEADEDNESS: 0
SHORTNESS OF BREATH: 0
CARDIOVASCULAR NEGATIVE: 1
EYE REDNESS: 1

## 2021-11-11 ASSESSMENT — VISUAL ACUITY: OU: 1

## 2021-11-11 NOTE — ED PROVIDER NOTES
History     Chief Complaint   Patient presents with     Rash     HPI  Soraida Lynn is a 76 year old female on no medciations who presents with concern for shingles. Sx started as a solitary pimple over LT brow about 7 or-so days ago. Over the past 72 hrs, she has developed two more vesicles on her forehead and scalp. She notes tenderness to surrounding skin, but pain has been manageable. She denies fevers. Denies visual disturbance. Denies recent illness.     Allergies:  No Known Allergies    Problem List:    Patient Active Problem List    Diagnosis Date Noted     Seborrheic keratosis 07/12/2011     Priority: Medium     Nicotine addiction 11/29/2010     Priority: Medium        Past Medical History:    Past Medical History:   Diagnosis Date     Closed fracture of skull (H)        Past Surgical History:    Past Surgical History:   Procedure Laterality Date     ARTHROSCOPY KNEE Right 06/18/2014    meniscus tear     ARTHROSCOPY SHOULDER ROTATOR CUFF REPAIR Right 03/2012    arthroscopy, rotator cuff repair, SLAP lesion     EXTRACAPSULAR CATARACT EXTRATION WITH INTRAOCULAR LENS IMPLANT  2010    planned left cataract removal, Dr. Black     PHACOEMULSIFICATION WITH STANDARD INTRAOCULAR LENS IMPLANT Right 2/24/2020    Procedure: PHACOEMULSIFICATION CATARACT EXTRACTION POSTERIOR CHAMBER LENS RIGHT EYE LI61AO 17.5;  Surgeon: Jorge Alberto Black MD;  Location: [facility]     WRIST SURGERY Right 07/2011    Right wrist cystectomy - Dr Aguilar       Family History:    Family History   Problem Relation Age of Onset     Pancreatic Cancer Mother         Cancer-pancreatic     Other - See Comments Father         AAA/high blood pressure     Cancer Father         Cancer,skin cancer on his nose     Heart Disease Brother         Heart Disease,afib       Social History:  Marital Status:   [2]  Social History     Tobacco Use     Smoking status: Current Every Day Smoker     Packs/day: 1.00     Types: Cigarettes     Smokeless tobacco: Never  Used   Substance Use Topics     Alcohol use: Yes     Drug use: Never        Medications:    sulfamethoxazole-trimethoprim (BACTRIM DS) 800-160 MG tablet  valACYclovir (VALTREX) 1000 mg tablet  Aspirin-Caffeine (GILLES BACK & BODY) 500-32.5 MG TABS  Ibuprofen-diphenhydrAMINE Cit (ADVIL PM) 200-38 MG TABS  multivitamin (CENTRUM SILVER) tablet          Review of Systems   Constitutional: Negative.  Negative for chills and fever.   HENT: Positive for facial swelling. Negative for sore throat and trouble swallowing.    Eyes: Positive for redness. Negative for photophobia, pain, discharge, itching and visual disturbance.   Respiratory: Negative.  Negative for cough, choking, shortness of breath, wheezing and stridor.    Cardiovascular: Negative.    Gastrointestinal: Negative for nausea and vomiting.   Musculoskeletal: Negative.    Skin: Positive for rash.   Neurological: Negative for syncope, light-headedness and headaches.       Physical Exam   BP: 147/84  Pulse: 95  Temp: 99.4  F (37.4  C)  Resp: 16  SpO2: 97 %      Physical Exam  Vitals and nursing note reviewed.   Constitutional:       General: She is not in acute distress.     Appearance: She is not toxic-appearing.      Comments: Obvious erythema of forehead, obvious LT conjunctivitis and edema of lids of LT eye.   HENT:      Head: Normocephalic and atraumatic.        Right Ear: Tympanic membrane normal.      Left Ear: Tympanic membrane normal.      Nose: Nose normal.      Mouth/Throat:      Mouth: Mucous membranes are moist.   Eyes:      General: Lids are normal. Vision grossly intact. No visual field deficit.     Extraocular Movements: Extraocular movements intact.      Conjunctiva/sclera:      Left eye: Left conjunctiva is injected. No chemosis or exudate.     Pupils: Pupils are equal, round, and reactive to light.      Left eye: No fluorescein uptake.      Slit lamp exam:     Left eye: No corneal flare, corneal ulcer or photophobia.   Cardiovascular:      Rate  and Rhythm: Normal rate.   Pulmonary:      Effort: Pulmonary effort is normal.   Lymphadenopathy:      Cervical: No cervical adenopathy.   Neurological:      General: No focal deficit present.      Mental Status: She is alert and oriented to person, place, and time.         ED Course        Procedures      No results found for this or any previous visit (from the past 24 hour(s)).  Medications - No data to display    Assessments & Plan (with Medical Decision Making)     I have reviewed the nursing notes.  I have reviewed the findings, diagnosis, plan and need for follow up with the patient.    Discharge Medication List as of 11/11/2021 11:00 AM      START taking these medications    Details   sulfamethoxazole-trimethoprim (BACTRIM DS) 800-160 MG tablet Take 1 tablet by mouth 2 times daily for 5 days, Disp-10 tablet, R-0, E-Prescribe      valACYclovir (VALTREX) 1000 mg tablet Take 1 tablet (1,000 mg) by mouth 3 times daily for 7 days, Disp-21 tablet, R-0, E-Prescribe             Final diagnoses:   Herpes zoster conjunctivitis - will cover impetigo given appearance   Despite duration, will start valtrex and cover for secondary infection as above. No fluorescein uptake on exam, however I did recommend close f/u with Dr Martin's office for eye recheck. Pt will seek attention sooner with worsening or any loss/chane of vision.      11/11/2021   [facility]     Kyler Yee PA  11/11/21 5700

## 2021-11-11 NOTE — DISCHARGE INSTRUCTIONS
Valacyclovir for shingles virus (may upset stomach). Take for 7 days.   Bactrim to cover staph/skin bacteria. Take for 5 days.   Eye doc early next week - no findings impacting the cornea, just some conjunctivitis right now.   Back here/ER with any visual disturbance/worsening!

## 2022-05-21 ENCOUNTER — HEALTH MAINTENANCE LETTER (OUTPATIENT)
Age: 77
End: 2022-05-21

## 2022-09-17 ENCOUNTER — HEALTH MAINTENANCE LETTER (OUTPATIENT)
Age: 77
End: 2022-09-17

## 2023-06-04 ENCOUNTER — HEALTH MAINTENANCE LETTER (OUTPATIENT)
Age: 78
End: 2023-06-04

## 2023-07-17 ENCOUNTER — HOSPITAL ENCOUNTER (EMERGENCY)
Facility: HOSPITAL | Age: 78
Discharge: HOME | End: 2023-07-17
Attending: NURSE PRACTITIONER | Admitting: NURSE PRACTITIONER
Payer: COMMERCIAL

## 2023-07-17 VITALS
RESPIRATION RATE: 18 BRPM | OXYGEN SATURATION: 97 % | DIASTOLIC BLOOD PRESSURE: 84 MMHG | TEMPERATURE: 98.3 F | SYSTOLIC BLOOD PRESSURE: 156 MMHG | HEART RATE: 76 BPM

## 2023-07-17 DIAGNOSIS — A69.20 ERYTHEMA MIGRANS (LYME DISEASE): Primary | ICD-10-CM

## 2023-07-17 PROCEDURE — 99213 OFFICE O/P EST LOW 20 MIN: CPT | Performed by: NURSE PRACTITIONER

## 2023-07-17 PROCEDURE — G0463 HOSPITAL OUTPT CLINIC VISIT: HCPCS

## 2023-07-17 RX ORDER — DOXYCYCLINE 100 MG/1
100 CAPSULE ORAL 2 TIMES DAILY
Qty: 20 CAPSULE | Refills: 0 | Status: SHIPPED | OUTPATIENT
Start: 2023-07-17 | End: 2023-07-27

## 2023-07-17 ASSESSMENT — ENCOUNTER SYMPTOMS
SHORTNESS OF BREATH: 0
VOMITING: 0
DIARRHEA: 0
NAUSEA: 0
CHILLS: 0
WEAKNESS: 0
NECK STIFFNESS: 0
PSYCHIATRIC NEGATIVE: 1
FATIGUE: 0
HEADACHES: 0
MYALGIAS: 0
NECK PAIN: 0
ARTHRALGIAS: 0
DIZZINESS: 0
FEVER: 0
PHOTOPHOBIA: 0

## 2023-07-17 NOTE — DISCHARGE INSTRUCTIONS
Doxycycline as ordered  - Take entire course of antibiotic even if you start to feel better.  - Antibiotics can cause stomach upset including nausea and diarrhea. Read your bottle or ask the pharmacist if antibiotic can be taken with food to help prevent nausea. If you have symptoms of diarrhea you can take an over-the-counter probiotic and/or increase foods with probiotics such as yogurt, [place], sauerkraut.    Follow handout provided follow-up as needed.      Return to urgent care/ED with any worsening in condition or additional concerns.

## 2023-07-17 NOTE — ED PROVIDER NOTES
History     Chief Complaint   Patient presents with     Insect Bite     HPI  Sroaida Lynn is a 78 year old female who presents to urgent care today ambulatory with complaints of a deer tick bite to left lower extremity which is now accompanied by the start up of a erythema migrans rash.  Patient noticed tick yesterday, is a deer tick.  Not engorged.  Unsure when bite occurred.  Denies any fever, chills, nausea, vomiting, diarrhea, shortness of breath or chest pain.  Denies any headache, dizziness, fatigue, neck pain or stiffness, myalgia, arthralgia.  History of Lyme disease 20 years ago.  No other concerns.    Allergies:  No Known Allergies    Problem List:    Patient Active Problem List    Diagnosis Date Noted     Seborrheic keratosis 07/12/2011     Priority: Medium     Nicotine addiction 11/29/2010     Priority: Medium        Past Medical History:    Past Medical History:   Diagnosis Date     Closed fracture of skull (H)        Past Surgical History:    Past Surgical History:   Procedure Laterality Date     ARTHROSCOPY KNEE Right 06/18/2014    meniscus tear     ARTHROSCOPY SHOULDER ROTATOR CUFF REPAIR Right 03/2012    arthroscopy, rotator cuff repair, SLAP lesion     EXTRACAPSULAR CATARACT EXTRATION WITH INTRAOCULAR LENS IMPLANT  2010    planned left cataract removal, Dr. Black     PHACOEMULSIFICATION WITH STANDARD INTRAOCULAR LENS IMPLANT Right 2/24/2020    Procedure: PHACOEMULSIFICATION CATARACT EXTRACTION POSTERIOR CHAMBER LENS RIGHT EYE LI61AO 17.5;  Surgeon: Jorg eAlberto Black MD;  Location: [facility]     WRIST SURGERY Right 07/2011    Right wrist cystectomy - Dr Aguilar       Family History:    Family History   Problem Relation Age of Onset     Pancreatic Cancer Mother         Cancer-pancreatic     Other - See Comments Father         AAA/high blood pressure     Cancer Father         Cancer,skin cancer on his nose     Heart Disease Brother         Heart Disease,afib       Social History:  Marital Status:    [2]  Social History     Tobacco Use     Smoking status: Every Day     Packs/day: 1.00     Types: Cigarettes     Smokeless tobacco: Never   Substance Use Topics     Alcohol use: Yes     Drug use: Never        Medications:    Aspirin-Caffeine (GILLES BACK & BODY) 500-32.5 MG TABS  doxycycline hyclate (VIBRAMYCIN) 100 MG capsule  Ibuprofen-diphenhydrAMINE Cit (ADVIL PM) 200-38 MG TABS  multivitamin (CENTRUM SILVER) tablet  valACYclovir (VALTREX) 1000 mg tablet      Review of Systems   Constitutional: Negative for chills, fatigue and fever.   Eyes: Negative for photophobia and visual disturbance.   Respiratory: Negative for shortness of breath.    Cardiovascular: Negative for chest pain.   Gastrointestinal: Negative for diarrhea, nausea and vomiting.   Musculoskeletal: Negative for arthralgias, gait problem, myalgias, neck pain and neck stiffness.   Skin: Positive for rash (Start of erythema migrans rash to left lower extremity, no drainage.).   Neurological: Negative for dizziness, weakness and headaches.   Psychiatric/Behavioral: Negative.      Physical Exam   BP: 156/84  Pulse: 76  Temp: 98.3  F (36.8  C)  Resp: 18  SpO2: 97 %    Physical Exam  Vitals and nursing note reviewed.   Constitutional:       General: She is not in acute distress.     Appearance: Normal appearance. She is not ill-appearing or toxic-appearing.   Cardiovascular:      Rate and Rhythm: Normal rate and regular rhythm.      Pulses: Normal pulses.      Heart sounds: Normal heart sounds.   Pulmonary:      Effort: Pulmonary effort is normal.      Breath sounds: Normal breath sounds.   Skin:     General: Skin is warm and dry.      Capillary Refill: Capillary refill takes less than 2 seconds.      Findings: Rash (Start of erythema migrans rash to left lower extremity, no drainage or signs of infection.) present.   Neurological:      Mental Status: She is alert.   Psychiatric:         Mood and Affect: Mood normal.       ED Course     No results  found for this or any previous visit (from the past 24 hour(s)).    Medications - No data to display    Assessments & Plan (with Medical Decision Making)     I have reviewed the nursing notes.    I have reviewed the findings, diagnosis, plan and need for follow up with the patient.  (A69.20) Erythema migrans (Lyme disease)  (primary encounter diagnosis)  Plan:   Patient ambulatory with a nontoxic appearance.  Patient arrived with complaints of a deer tick bite to left lower extremity which she removed yesterday, unsure of when deer tick got embedded.  Deer tick was not engorged.  Erythema migrans rash starting to appear on left lower extremity, no signs of infection or drainage present.  Patient states she is asymptomatic, has history of Lyme disease 20 years ago.  Denies any fever, chills, nausea, vomiting, diarrhea, shortness of breath or chest pain.  Denies any headache, vision changes, fatigue, neck pain or stiffness, arthralgia or myalgia.  Will prescribe doxycycline 10-day course to cover for erythema migrans rash.  Handout provided on Lyme disease.  Patient to follow-up with primary care provider or return to urgent care/ED with any worsening in condition or additional concerns.  Patient is in agreement with treatment plan.    Discharge Medication List as of 7/17/2023 12:13 PM      START taking these medications    Details   doxycycline hyclate (VIBRAMYCIN) 100 MG capsule Take 1 capsule (100 mg) by mouth 2 times daily for 10 days, Disp-20 capsule, R-0, E-Prescribe           Final diagnoses:   Erythema migrans (Lyme disease)     7/17/2023   [facility]     Veronika Harris NP  07/17/23 [id number]

## 2023-07-17 NOTE — ED TRIAGE NOTES
Pt presents with c/o deer tick bite to the left shin/calf  Noticeably red around the area no bulls eye rash  Pt unsure of when bitten just found it last night and took it off   No otc meds taken today

## 2023-08-12 ENCOUNTER — HOSPITAL ENCOUNTER (EMERGENCY)
Facility: HOSPITAL | Age: 78
Discharge: HOME | End: 2023-08-12
Attending: PHYSICIAN ASSISTANT | Admitting: PHYSICIAN ASSISTANT
Payer: COMMERCIAL

## 2023-08-12 VITALS
SYSTOLIC BLOOD PRESSURE: 141 MMHG | OXYGEN SATURATION: 97 % | RESPIRATION RATE: 18 BRPM | TEMPERATURE: 97.9 F | HEART RATE: 82 BPM | DIASTOLIC BLOOD PRESSURE: 88 MMHG

## 2023-08-12 DIAGNOSIS — S61.211A LACERATION OF LEFT INDEX FINGER WITHOUT FOREIGN BODY WITHOUT DAMAGE TO NAIL, INITIAL ENCOUNTER: ICD-10-CM

## 2023-08-12 PROCEDURE — 90471 IMMUNIZATION ADMIN: CPT | Performed by: PHYSICIAN ASSISTANT

## 2023-08-12 PROCEDURE — 90715 TDAP VACCINE 7 YRS/> IM: CPT | Performed by: PHYSICIAN ASSISTANT

## 2023-08-12 PROCEDURE — 999N000104 HC STATISTIC NO CHARGE

## 2023-08-12 PROCEDURE — 12001 RPR S/N/AX/GEN/TRNK 2.5CM/<: CPT

## 2023-08-12 PROCEDURE — 250N000011 HC RX IP 250 OP 636: Performed by: PHYSICIAN ASSISTANT

## 2023-08-12 PROCEDURE — 12001 RPR S/N/AX/GEN/TRNK 2.5CM/<: CPT | Performed by: PHYSICIAN ASSISTANT

## 2023-08-12 RX ADMIN — CLOSTRIDIUM TETANI TOXOID ANTIGEN (FORMALDEHYDE INACTIVATED), CORYNEBACTERIUM DIPHTHERIAE TOXOID ANTIGEN (FORMALDEHYDE INACTIVATED), BORDETELLA PERTUSSIS TOXOID ANTIGEN (GLUTARALDEHYDE INACTIVATED), BORDETELLA PERTUSSIS FILAMENTOUS HEMAGGLUTININ ANTIGEN (FORMALDEHYDE INACTIVATED), BORDETELLA PERTUSSIS PERTACTIN ANTIGEN, AND BORDETELLA PERTUSSIS FIMBRIAE 2/3 ANTIGEN 0.5 ML: 5; 2; 2.5; 5; 3; 5 INJECTION, SUSPENSION INTRAMUSCULAR at 14:36

## 2023-08-12 ASSESSMENT — ENCOUNTER SYMPTOMS
NUMBNESS: 0
CONSTITUTIONAL NEGATIVE: 1
RESPIRATORY NEGATIVE: 1
WOUND: 1
CARDIOVASCULAR NEGATIVE: 1
WEAKNESS: 0

## 2023-08-12 NOTE — DISCHARGE INSTRUCTIONS
No soaking for 2 days.   Keep covered and in splint for 2-3 days. (Check the fingertip to make sure it's still pink, not green or black.)  Let the glue wear off on it's own and the steri strips fall off on their own.   Rest, elevate, tylenol 1000mg 3x daily for pain  Tetanus updated.

## 2023-08-12 NOTE — ED TRIAGE NOTES
Patient presents with laceration to left hand 5th and 2nd fingers after a ladder cut her fingers.

## 2023-08-12 NOTE — ED TRIAGE NOTES
Laceration on 5th and 2nd from falling on a ladder about 20 mins ago.    Jessa Behrman, GEORGINAN

## 2023-08-12 NOTE — ED PROVIDER NOTES
History     Chief Complaint   Patient presents with    Laceration     HPI  Soraida Lynn is a 78 year old female on no prescription medications, who presents after cut fingers on a ladder during a building project. Injury occurred just PTA. She is unsure of tetanus status. Bleeding controlled with pressure.     Allergies:  No Known Allergies    Problem List:    Patient Active Problem List    Diagnosis Date Noted    Seborrheic keratosis 07/12/2011     Priority: Medium    Nicotine addiction 11/29/2010     Priority: Medium        Past Medical History:    Past Medical History:   Diagnosis Date    Closed fracture of skull (H)        Past Surgical History:    Past Surgical History:   Procedure Laterality Date    ARTHROSCOPY KNEE Right 06/18/2014    meniscus tear    ARTHROSCOPY SHOULDER ROTATOR CUFF REPAIR Right 03/2012    arthroscopy, rotator cuff repair, SLAP lesion    EXTRACAPSULAR CATARACT EXTRATION WITH INTRAOCULAR LENS IMPLANT  2010    planned left cataract removal, Dr. Black    PHACOEMULSIFICATION WITH STANDARD INTRAOCULAR LENS IMPLANT Right 2/24/2020    Procedure: PHACOEMULSIFICATION CATARACT EXTRACTION POSTERIOR CHAMBER LENS RIGHT EYE LI61AO 17.5;  Surgeon: Jorge Alberto Black MD;  Location: [facility]    WRIST SURGERY Right 07/2011    Right wrist cystectomy - Dr Aguilar       Family History:    Family History   Problem Relation Age of Onset    Pancreatic Cancer Mother         Cancer-pancreatic    Other - See Comments Father         AAA/high blood pressure    Cancer Father         Cancer,skin cancer on his nose    Heart Disease Brother         Heart Disease,afib       Social History:  Marital Status:   [2]  Social History     Tobacco Use    Smoking status: Every Day     Packs/day: 1.00     Types: Cigarettes    Smokeless tobacco: Never   Substance Use Topics    Alcohol use: Yes    Drug use: Never        Medications:    Aspirin-Caffeine (GILLES BACK & BODY) 500-32.5 MG TABS  Ibuprofen-diphenhydrAMINE Cit (ADVIL PM)  200-38 MG TABS  multivitamin (CENTRUM SILVER) tablet  valACYclovir (VALTREX) 1000 mg tablet          Review of Systems   Constitutional: Negative.    Respiratory: Negative.     Cardiovascular: Negative.    Skin:  Positive for wound.   Neurological:  Negative for weakness and numbness.       Physical Exam   BP: 141/88  Pulse: 82  Temp: 97.9  F (36.6  C)  Resp: 18  SpO2: 97 %      Physical Exam  Vitals and nursing note reviewed.   Constitutional:       General: She is not in acute distress.     Appearance: She is not toxic-appearing.   Cardiovascular:      Rate and Rhythm: Normal rate.   Pulmonary:      Effort: Pulmonary effort is normal.   Musculoskeletal:      Left hand: Laceration present. Normal range of motion. Normal sensation. Normal capillary refill.        Hands:    Skin:     General: Skin is warm and dry.   Neurological:      Mental Status: She is alert and oriented to person, place, and time.         ED Course                 [facility]    -Laceration Repair    Date/Time: 8/12/2023 2:25 PM    Performed by: Kyler Yee PA  Authorized by: Kyler Yee PA    Risks, benefits and alternatives discussed.      ANESTHESIA (see MAR for exact dosages):     Anesthesia method:  None  LACERATION DETAILS     Location:  Finger    Finger location:  L index finger (and pinky, 1cm)    Length (cm):  2    Laceration depth: avulsion-type J-shaped palmar surface/pad.    REPAIR TYPE:     Repair type:  Simple    EXPLORATION:     Hemostasis achieved with:  Direct pressure    Contaminated: no      TREATMENT:     Area cleansed with:  Hibiclens and saline    Amount of cleaning:  Extensive    Irrigation method:  Syringe    Visualized foreign bodies/material removed: no      SKIN REPAIR     Repair method:  Steri-Strips and tissue adhesive    Number of Steri-Strips: 3 on pinky, 3 on pointer.    POST-PROCEDURE DETAILS     Dressing:  Splint for protection (splint pointer finter only)      No results found for this or  any previous visit (from the past 24 hour(s)).    Medications   Tdap (tetanus-diphtheria-acell pertussis) (ADACEL) injection 0.5 mL (0.5 mLs Intramuscular $Given 8/12/23 1987)       Assessments & Plan (with Medical Decision Making)     I have reviewed the nursing notes.  I have reviewed the findings, diagnosis, plan and need for follow up with the patient.  New Prescriptions    No medications on file       Final diagnoses:   Laceration of left index finger without foreign body without damage to nail, initial encounter   Wound repair as above. Clean/dry for 48 hrs. Wound checks daily, allow steri strips to fall off on own. Rest, elevate, tylenol for pain. Monitor and follow up with concern for infection. Patient verbally educated and given appropriate education sheets for the diagnoses and has no questions.    JOHN Kern, PA-C   8/12/2023   2:49 PM      8/12/2023   [facility]       Kyler Yee PA  08/12/23 [id number]

## 2024-07-13 ENCOUNTER — HEALTH MAINTENANCE LETTER (OUTPATIENT)
Age: 79
End: 2024-07-13

## 2025-04-17 ENCOUNTER — APPOINTMENT (OUTPATIENT)
Dept: CT IMAGING | Facility: HOSPITAL | Age: 80
End: 2025-04-17
Attending: NURSE PRACTITIONER
Payer: COMMERCIAL

## 2025-04-17 ENCOUNTER — HOSPITAL ENCOUNTER (EMERGENCY)
Facility: HOSPITAL | Age: 80
Discharge: HOME | End: 2025-04-17
Attending: NURSE PRACTITIONER
Payer: COMMERCIAL

## 2025-04-17 VITALS
SYSTOLIC BLOOD PRESSURE: 135 MMHG | OXYGEN SATURATION: 94 % | TEMPERATURE: 97.5 F | BODY MASS INDEX: 24.02 KG/M2 | HEART RATE: 104 BPM | WEIGHT: 148.81 LBS | DIASTOLIC BLOOD PRESSURE: 89 MMHG | RESPIRATION RATE: 14 BRPM

## 2025-04-17 DIAGNOSIS — K92.2 LOWER GI BLEED: ICD-10-CM

## 2025-04-17 DIAGNOSIS — R06.02 SHORTNESS OF BREATH: ICD-10-CM

## 2025-04-17 DIAGNOSIS — R07.89 CHEST HEAVINESS: ICD-10-CM

## 2025-04-17 LAB
ALBUMIN SERPL BCG-MCNC: 3.6 G/DL (ref 3.5–5.2)
ALP SERPL-CCNC: 61 U/L (ref 40–150)
ALT SERPL W P-5'-P-CCNC: 13 U/L (ref 0–50)
ANION GAP SERPL CALCULATED.3IONS-SCNC: 11 MMOL/L (ref 7–15)
APTT PPP: 32 SECONDS (ref 22–38)
AST SERPL W P-5'-P-CCNC: 17 U/L (ref 0–45)
ATRIAL RATE - MUSE: 103 BPM
ATRIAL RATE - MUSE: 106 BPM
BILIRUB SERPL-MCNC: 0.2 MG/DL
BUN SERPL-MCNC: 26.8 MG/DL (ref 8–23)
CALCIUM SERPL-MCNC: 8.3 MG/DL (ref 8.8–10.4)
CHLORIDE SERPL-SCNC: 99 MMOL/L (ref 98–107)
CREAT SERPL-MCNC: 0.77 MG/DL (ref 0.51–0.95)
DIASTOLIC BLOOD PRESSURE - MUSE: NORMAL MMHG
DIASTOLIC BLOOD PRESSURE - MUSE: NORMAL MMHG
EGFRCR SERPLBLD CKD-EPI 2021: 77 ML/MIN/1.73M2
ERYTHROCYTE [DISTWIDTH] IN BLOOD BY AUTOMATED COUNT: 13.1 % (ref 10–15)
GLUCOSE SERPL-MCNC: 156 MG/DL (ref 70–99)
HCO3 SERPL-SCNC: 22 MMOL/L (ref 22–29)
HCT VFR BLD AUTO: 29.4 % (ref 35–47)
HEMOCCULT SP1 STL QL: POSITIVE
HGB BLD-MCNC: 10.2 G/DL (ref 11.7–15.7)
HGB BLD-MCNC: 9.5 G/DL (ref 11.7–15.7)
HOLD SPECIMEN: NORMAL
HOLD SPECIMEN: NORMAL
INR PPP: 1.11 (ref 0.85–1.15)
INTERPRETATION ECG - MUSE: NORMAL
INTERPRETATION ECG - MUSE: NORMAL
LIPASE SERPL-CCNC: 49 U/L (ref 13–60)
MAGNESIUM SERPL-MCNC: 1.9 MG/DL (ref 1.7–2.3)
MCH RBC QN AUTO: 31.1 PG (ref 26.5–33)
MCHC RBC AUTO-ENTMCNC: 34.7 G/DL (ref 31.5–36.5)
MCV RBC AUTO: 90 FL (ref 78–100)
P AXIS - MUSE: 50 DEGREES
P AXIS - MUSE: 51 DEGREES
PLATELET # BLD AUTO: 174 10E3/UL (ref 150–450)
POTASSIUM SERPL-SCNC: 4.4 MMOL/L (ref 3.4–5.3)
PR INTERVAL - MUSE: 122 MS
PR INTERVAL - MUSE: 128 MS
PROT SERPL-MCNC: 6 G/DL (ref 6.4–8.3)
QRS DURATION - MUSE: 90 MS
QRS DURATION - MUSE: 92 MS
QT - MUSE: 346 MS
QT - MUSE: 374 MS
QTC - MUSE: 459 MS
QTC - MUSE: 489 MS
R AXIS - MUSE: -25 DEGREES
R AXIS - MUSE: -29 DEGREES
RBC # BLD AUTO: 3.28 10E6/UL (ref 3.8–5.2)
SODIUM SERPL-SCNC: 132 MMOL/L (ref 135–145)
SYSTOLIC BLOOD PRESSURE - MUSE: NORMAL MMHG
SYSTOLIC BLOOD PRESSURE - MUSE: NORMAL MMHG
T AXIS - MUSE: 107 DEGREES
T AXIS - MUSE: 111 DEGREES
TROPONIN T SERPL HS-MCNC: 15 NG/L
TROPONIN T SERPL HS-MCNC: 15 NG/L
TROPONIN T SERPL HS-MCNC: 8 NG/L
TSH SERPL DL<=0.005 MIU/L-ACNC: 0.4 UIU/ML (ref 0.3–4.2)
VENTRICULAR RATE- MUSE: 103 BPM
VENTRICULAR RATE- MUSE: 106 BPM
WBC # BLD AUTO: 7 10E3/UL (ref 4–11)

## 2025-04-17 PROCEDURE — 250N000011 HC RX IP 250 OP 636: Performed by: NURSE PRACTITIONER

## 2025-04-17 PROCEDURE — 93010 ELECTROCARDIOGRAM REPORT: CPT | Performed by: INTERNAL MEDICINE

## 2025-04-17 PROCEDURE — 93005 ELECTROCARDIOGRAM TRACING: CPT

## 2025-04-17 PROCEDURE — 83735 ASSAY OF MAGNESIUM: CPT | Performed by: NURSE PRACTITIONER

## 2025-04-17 PROCEDURE — 85014 HEMATOCRIT: CPT | Performed by: NURSE PRACTITIONER

## 2025-04-17 PROCEDURE — 84484 ASSAY OF TROPONIN QUANT: CPT | Performed by: NURSE PRACTITIONER

## 2025-04-17 PROCEDURE — 85730 THROMBOPLASTIN TIME PARTIAL: CPT | Performed by: NURSE PRACTITIONER

## 2025-04-17 PROCEDURE — 74177 CT ABD & PELVIS W/CONTRAST: CPT | Mod: 26 | Performed by: RADIOLOGY

## 2025-04-17 PROCEDURE — 85018 HEMOGLOBIN: CPT | Performed by: NURSE PRACTITIONER

## 2025-04-17 PROCEDURE — 82274 ASSAY TEST FOR BLOOD FECAL: CPT | Performed by: NURSE PRACTITIONER

## 2025-04-17 PROCEDURE — 96374 THER/PROPH/DIAG INJ IV PUSH: CPT | Mod: XU

## 2025-04-17 PROCEDURE — 258N000003 HC RX IP 258 OP 636: Performed by: NURSE PRACTITIONER

## 2025-04-17 PROCEDURE — 84155 ASSAY OF PROTEIN SERUM: CPT | Performed by: NURSE PRACTITIONER

## 2025-04-17 PROCEDURE — 99285 EMERGENCY DEPT VISIT HI MDM: CPT | Performed by: NURSE PRACTITIONER

## 2025-04-17 PROCEDURE — 36415 COLL VENOUS BLD VENIPUNCTURE: CPT | Performed by: NURSE PRACTITIONER

## 2025-04-17 PROCEDURE — 99285 EMERGENCY DEPT VISIT HI MDM: CPT | Mod: 25

## 2025-04-17 PROCEDURE — 84443 ASSAY THYROID STIM HORMONE: CPT | Performed by: NURSE PRACTITIONER

## 2025-04-17 PROCEDURE — 74177 CT ABD & PELVIS W/CONTRAST: CPT

## 2025-04-17 PROCEDURE — 85610 PROTHROMBIN TIME: CPT | Performed by: NURSE PRACTITIONER

## 2025-04-17 PROCEDURE — 96375 TX/PRO/DX INJ NEW DRUG ADDON: CPT

## 2025-04-17 PROCEDURE — 96361 HYDRATE IV INFUSION ADD-ON: CPT

## 2025-04-17 PROCEDURE — 83690 ASSAY OF LIPASE: CPT | Performed by: NURSE PRACTITIONER

## 2025-04-17 RX ORDER — IOPAMIDOL 755 MG/ML
73 INJECTION, SOLUTION INTRAVASCULAR ONCE
Status: COMPLETED | OUTPATIENT
Start: 2025-04-17 | End: 2025-04-17

## 2025-04-17 RX ADMIN — FAMOTIDINE 40 MG: 10 INJECTION, SOLUTION INTRAVENOUS at 22:50

## 2025-04-17 RX ADMIN — PANTOPRAZOLE SODIUM 80 MG: 40 INJECTION, POWDER, FOR SOLUTION INTRAVENOUS at 22:50

## 2025-04-17 RX ADMIN — SODIUM CHLORIDE, SODIUM LACTATE, POTASSIUM CHLORIDE, AND CALCIUM CHLORIDE 1000 ML: .6; .31; .03; .02 INJECTION, SOLUTION INTRAVENOUS at 17:49

## 2025-04-17 RX ADMIN — IOPAMIDOL 73 ML: 755 INJECTION, SOLUTION INTRAVENOUS at 17:36

## 2025-04-17 ASSESSMENT — COLUMBIA-SUICIDE SEVERITY RATING SCALE - C-SSRS
2. HAVE YOU ACTUALLY HAD ANY THOUGHTS OF KILLING YOURSELF IN THE PAST MONTH?: NO
1. IN THE PAST MONTH, HAVE YOU WISHED YOU WERE DEAD OR WISHED YOU COULD GO TO SLEEP AND NOT WAKE UP?: NO
6. HAVE YOU EVER DONE ANYTHING, STARTED TO DO ANYTHING, OR PREPARED TO DO ANYTHING TO END YOUR LIFE?: NO

## 2025-04-17 ASSESSMENT — ENCOUNTER SYMPTOMS
HEMATOLOGIC/LYMPHATIC NEGATIVE: 1
ENDOCRINE NEGATIVE: 1
VOMITING: 1
CARDIOVASCULAR NEGATIVE: 1
EYES NEGATIVE: 1
PSYCHIATRIC NEGATIVE: 1
DIARRHEA: 1
BLOOD IN STOOL: 1
NEUROLOGICAL NEGATIVE: 1
RESPIRATORY NEGATIVE: 1
CONSTITUTIONAL NEGATIVE: 1
MUSCULOSKELETAL NEGATIVE: 1
NAUSEA: 1
ABDOMINAL PAIN: 1
CONSTIPATION: 0
ALLERGIC/IMMUNOLOGIC NEGATIVE: 1

## 2025-04-17 ASSESSMENT — ACTIVITIES OF DAILY LIVING (ADL)
ADLS_ACUITY_SCORE: 41

## 2025-04-17 NOTE — ED TRIAGE NOTES
Pt presents with c/o tachycardia and SOB after vomiting yesterday and had 3 bouts of dark colored BM and dark diarrhea.

## 2025-04-17 NOTE — ED PROVIDER NOTES
"  History     Chief Complaint   Patient presents with    Tachycardia    Shortness of Breath     HPI  Soraida Lynn is a 79 year old individual comes in for complaints of tachycardia, shortness of breath, lightheadedness, and possible blood in stools and vomit.  Patient states that she ate supper yesterday and immediately felt like she was going to throw up.  States that she went to the bathroom and had \"projectile vomiting that had what look like black blood clots in it\".  States that she did go to bed and woke up today and had coffee.  Then today had 3 episodes of stools in a row.  States the first 1 appeared very dark and nature with questionable blood clots in it.  Immediately had loose stool with again what appeared like blood clots in there.  Shortly after had a third loose stool and patient states there was some red in the toilet.  Comes in for evaluation.  Dates has been feeling short of breath and lightheaded with this.  Has been having some left-sided abdominal pain for the past month.  No urinary symptoms.    Allergies:  No Known Allergies    Problem List:    Patient Active Problem List    Diagnosis Date Noted    Seborrheic keratosis 07/12/2011     Priority: Medium    Nicotine addiction 11/29/2010     Priority: Medium        Past Medical History:    Past Medical History:   Diagnosis Date    Closed fracture of skull (H)        Past Surgical History:    Past Surgical History:   Procedure Laterality Date    ARTHROSCOPY KNEE Right 06/18/2014    meniscus tear    ARTHROSCOPY SHOULDER ROTATOR CUFF REPAIR Right 03/2012    arthroscopy, rotator cuff repair, SLAP lesion    EXTRACAPSULAR CATARACT EXTRATION WITH INTRAOCULAR LENS IMPLANT  2010    planned left cataract removal, Dr. Black    PHACOEMULSIFICATION WITH STANDARD INTRAOCULAR LENS IMPLANT Right 2/24/2020    Procedure: PHACOEMULSIFICATION CATARACT EXTRACTION POSTERIOR CHAMBER LENS RIGHT EYE LI61AO 17.5;  Surgeon: Jorge Alberto Black MD;  Location: [facility] OR    [facility] " SURGERY Right 07/2011    Right wrist cystectomy - Dr Aguilar       Family History:    Family History   Problem Relation Age of Onset    Pancreatic Cancer Mother         Cancer-pancreatic    Other - See Comments Father         AAA/high blood pressure    Cancer Father         Cancer,skin cancer on his nose    Heart Disease Brother         Heart Disease,afib       Social History:  Marital Status:   [2]  Social History     Tobacco Use    Smoking status: Every Day     Current packs/day: 1.00     Types: Cigarettes    Smokeless tobacco: Never   Substance Use Topics    Alcohol use: Yes    Drug use: Never        Medications:    Aspirin-Caffeine (GILLES BACK & BODY) 500-32.5 MG TABS  Ibuprofen-diphenhydrAMINE Cit (ADVIL PM) 200-38 MG TABS  multivitamin (CENTRUM SILVER) tablet  valACYclovir (VALTREX) 1000 mg tablet          Review of Systems   Constitutional: Negative.    HENT: Negative.     Eyes: Negative.    Respiratory: Negative.     Cardiovascular: Negative.    Gastrointestinal:  Positive for abdominal pain, blood in stool, diarrhea, nausea and vomiting. Negative for constipation.   Endocrine: Negative.    Genitourinary: Negative.    Musculoskeletal: Negative.    Skin: Negative.    Allergic/Immunologic: Negative.    Neurological: Negative.    Hematological: Negative.    Psychiatric/Behavioral: Negative.         Physical Exam   BP: 123/69  Pulse: 116  Temp: 97.5  F (36.4  C)  Resp: 16  Weight: 67.5 kg (148 lb 13 oz)  SpO2: 97 %      GENERAL APPEARANCE:  The patient is a 79 year old well-developed, well-nourished individual that appears as stated age.  LUNGS:  Breathing is easy.  Breath sounds are equal and clear bilaterally.  No wheezes, rhonchi, or rales.  HEART:  Regular rate and rhythm with normal S1 and S2.  No murmurs, gallops, or rubs.  ABDOMEN:  Soft.  Left-sided abdominal tenderness and right upper quadrant tenderness to palpation.  No mass, tenderness, guarding, or rebound.  No organomegaly or hernia.  Bowel  sounds are present.  No CVA tenderness or flank mass.  No abdominal bruits or thrills present upon auscultation/palpation.  GENITOURINARY: No obvious anterior pelvic tenderness, hernia, mass noted to palpation.    RECTAL:  Assessment completed with chaperone Christophe Zhang, ED Tech present.  No external hemorrhoids noted.  No rectal masses present.  Did have dark maroon liquid on the glove returned.  PSYCHIATRIC:  The patient is awake, alert, and oriented x4.  Recent and remote memory is intact.  Appropriate mood and affect.  Calm and cooperative with history and physical exam.  SKIN:  Warm, dry, and well perfused.  Good turgor.  No lesions, nodules, or rashes are noted.  No bruising noted.      ED Course     ED Course as of 04/17/25 2255   Thu Apr 17, 2025   1544 Labs ordered.   1544 In to see patient and history/physical completed.    1546 EKG 12-lead, tracing only  ECG shows no acute abnormality at this time.   1655 Hemoglobin(!): 10.2  Hemoglobin 10.2.  Unfortunately last hemoglobin for comparison is 10 years ago and was normal.   1701 1 L LR ordered for hydration.   1940 Troponin T, High Sensitivity(!): 15  High-sensitivity troponin has increased from 8 up to 5.  4-hour repeat ordered.  Also order repeat hemoglobin as patient remains tachycardic.   2005 EKG 12-lead, tracing only  Repeat ECG shows no acute changes.   2123 Troponin T, High Sensitivity  Nursing states that patient got up to the bathroom and passed gas but does get tachycardic and short of breath with this.  Does get chest heaviness with this also dissipates when sitting down.   2137 Discussed admission with the patient.  Patient is not sure if she would like to go to [place] where further care can be conducted versus local admission.   2209 Patient prefers to be transferred.  Contacted [facility].  They will call back.   2237 Discussed case with Hospitalist Dr. Aceves.  Patient accepted for transfer to [facility].  Pantoprazole and  famotidine ordered for GI bleed.            ECG:    ECG competed at 1544 and personally reviewed at 1546 showing sinus tachycardia with frequent PACs.  Normal axis.  Lateral ST/T wave abnormality is present.  Voltage criteria for LVH is present.  Abnormal ECG.  When compared to ECG from 6/12/2014, now has frequent PACs and lateral wave ST/T wave abnormalities present.     Repeat ECG completed at 2003 and personally reviewed at 2005 showing sinus tachycardia with PACs.  Ventricular rate 103 and QTc of 489.  Lateral T wave abnormality present.  When compared to ECG from earlier, ST abnormality in the lateral leads has improved but continues to have T wave abnormality.       Results for orders placed or performed during the hospital encounter of 04/17/25 (from the past 24 hours)   [place] Draw *Canceled*    Narrative    The following orders were created for panel order [place] Draw.  Procedure                               Abnormality         Status                     ---------                               -----------         ------                       Please view results for these tests on the individual orders.   EKG 12-lead, tracing only   Result Value Ref Range    Systolic Blood Pressure  mmHg    Diastolic Blood Pressure  mmHg    Ventricular Rate 106 BPM    Atrial Rate 106 BPM    AZ Interval 122 ms    QRS Duration 92 ms     ms    QTc 459 ms    P Axis 51 degrees    R AXIS -29 degrees    T Axis 111 degrees    Interpretation ECG       Sinus tachycardia with frequent Premature ventricular complexes  Minimal voltage criteria for LVH, may be normal variant ( [place] product )  ST & T wave abnormality, consider lateral ischemia  Abnormal ECG  No previous ECGs available     CBC with platelets   Result Value Ref Range    WBC Count 7.0 4.0 - 11.0 10e3/uL    RBC Count 3.28 (L) 3.80 - 5.20 10e6/uL    Hemoglobin 10.2 (L) 11.7 - 15.7 g/dL    Hematocrit 29.4 (L) 35.0 - 47.0 %    MCV 90 78 - 100 fL    MCH 31.1 26.5 -  33.0 pg    MCHC 34.7 31.5 - 36.5 g/dL    RDW 13.1 10.0 - 15.0 %    Platelet Count 174 150 - 450 10e3/uL   INR   Result Value Ref Range    INR 1.11 0.85 - 1.15   Partial thromboplastin time   Result Value Ref Range    aPTT 32 22 - 38 Seconds   Comprehensive metabolic panel   Result Value Ref Range    Sodium 132 (L) 135 - 145 mmol/L    Potassium 4.4 3.4 - 5.3 mmol/L    Carbon Dioxide (CO2) 22 22 - 29 mmol/L    Anion Gap 11 7 - 15 mmol/L    Urea Nitrogen 26.8 (H) 8.0 - 23.0 mg/dL    Creatinine 0.77 0.51 - 0.95 mg/dL    GFR Estimate 77 >60 mL/min/1.73m2    Calcium 8.3 (L) 8.8 - 10.4 mg/dL    Chloride 99 98 - 107 mmol/L    Glucose 156 (H) 70 - 99 mg/dL    Alkaline Phosphatase 61 40 - 150 U/L    AST 17 0 - 45 U/L    ALT 13 0 - 50 U/L    Protein Total 6.0 (L) 6.4 - 8.3 g/dL    Albumin 3.6 3.5 - 5.2 g/dL    Bilirubin Total 0.2 <=1.2 mg/dL   Lipase   Result Value Ref Range    Lipase 49 13 - 60 U/L   Troponin T, High Sensitivity   Result Value Ref Range    Troponin T, High Sensitivity 8 <=14 ng/L   Magnesium   Result Value Ref Range    Magnesium 1.9 1.7 - 2.3 mg/dL   TSH with free T4 reflex   Result Value Ref Range    TSH 0.40 0.30 - 4.20 uIU/mL   Extra Tube    Narrative    The following orders were created for panel order Extra Tube.  Procedure                               Abnormality         Status                     ---------                               -----------         ------                     Extra Red Top Tube[[id number]]                              Final result               Extra Heparinized Syringe[[id number]]                       Final result                 Please view results for these tests on the individual orders.   Extra Red Top Tube   Result Value Ref Range    Hold Specimen JIC    Extra Heparinized Syringe   Result Value Ref Range    Hold Specimen OK    CT Abdomen Pelvis w Contrast    Narrative    EXAM: CT ABDOMEN PELVIS W CONTRAST  LOCATION: [facility]  DATE: 4/17/2025    INDICATION:  Left-sided abdominal pain with possible blood in stool.  COMPARISON: None.  TECHNIQUE: CT scan of the abdomen and pelvis was performed following injection of IV contrast. Multiplanar reformats were obtained. Dose reduction techniques were used.  CONTRAST: ISOVUE 370  73 mL    FINDINGS:   LOWER CHEST: Normal.    HEPATOBILIARY: Normal.    PANCREAS: Cystic-appearing area within the pancreatic neck region. It is difficult to determine if this is partially due to fatty infiltration or clustered small cysts. The largest component measures 19 x 14 mm. No pancreatic ductal dilatation or   inflammation.    SPLEEN: Normal.    ADRENAL GLANDS: Moderate to low-attenuation thickening and nodularity of both adrenal glands, greater on the right, due to bilateral adrenal adenomas.    KIDNEYS/BLADDER: Cortical cyst right kidney requires no follow-up. The left kidney and urinary bladder appear normal.    BOWEL: The appendix is normal. Diverticular disease of the colon without evidence for acute diverticulitis. No evidence for bowel obstruction or inflammatory changes.    LYMPH NODES: No lymphadenopathy.    VASCULATURE: Atherosclerotic changes of the aorta without aneurysm.    PELVIC ORGANS: 1.5 cm thick area within the right ovary. No free fluid.    MUSCULOSKELETAL: Degenerative changes lumbar spine.      Impression    IMPRESSION:   1.  Diverticular disease of the colon without evidence for acute diverticulitis. No evidence for bowel obstruction or inflammatory changes.  2.  Cystic area within the pancreatic neck region. It is difficult to determine if this is due to fatty infiltration or clustered cysts. Recommend a follow-up elective MRI of the pancreas for further characterization.  3.  Bilateral adrenal adenomas.  4.  1.5 cm cystic lesion within the right ovary. Recommend a follow-up pelvic ultrasound in 6 months to monitor for stability.   Immunos occult blood   Result Value Ref Range    Occult Blood Slide 1 Positive (A) Negative    Troponin T, High Sensitivity   Result Value Ref Range    Troponin T, High Sensitivity 15 (H) <=14 ng/L   EKG 12-lead, tracing only   Result Value Ref Range    Systolic Blood Pressure  mmHg    Diastolic Blood Pressure  mmHg    Ventricular Rate 103 BPM    Atrial Rate 103 BPM    CO Interval 128 ms    QRS Duration 90 ms     ms    QTc 489 ms    P Axis 50 degrees    R AXIS -25 degrees    T Axis 107 degrees    Interpretation ECG       Sinus tachycardia with frequent Premature ventricular complexes  Nonspecific T wave abnormality  Abnormal ECG  When compared with ECG of 17-Apr-2025 15:44, (unconfirmed)  No significant change was found     Troponin T, High Sensitivity   Result Value Ref Range    Troponin T, High Sensitivity 15 (H) <=14 ng/L   Hemoglobin   Result Value Ref Range    Hemoglobin 9.5 (L) 11.7 - 15.7 g/dL       Medications   lactated ringers BOLUS 1,000 mL (0 mLs Intravenous Stopped 4/17/25 2114)   sodium chloride (PF) 0.9% PF flush 50 mL (50 mLs Intravenous $Given 4/17/25 1736)   iopamidol (ISOVUE-370) solution 73 mL (73 mLs Intravenous $Given 4/17/25 1736)   famotidine (PEPCID) injection 40 mg (40 mg Intravenous $Given 4/17/25 2250)   pantoprazole (PROTONIX) IV push injection 80 mg (80 mg Intravenous $Given 4/17/25 2250)       Assessments & Plan (with Medical Decision Making)     I have reviewed the nursing notes.    I have reviewed the findings, diagnosis, plan and need for follow up with the patient.    Summary:  Patient presents to the ER today for possible blood in stools and vomit.  Potential diagnosis which have been considered and evaluated include upper GI bleed, lower GI bleed, gastroenteritis, diverticulitis, viral illness as well as others. Many of these have been excluded using the various modalities and assessment as noted on the chart. At the present time, the diagnosis given seems to be the most likely shortness of breath with chest heaviness on exertion, lower GI bleed.  Upon arrival,  "vitals signs show blood pressure 123/69 with a pulse of 116.  Temperature 97.5  F.  Respirations 16 with oxygenation 97% on room air.  The patient is alert and oriented.  Cardiac and respiratory examination normal.  Left-sided and right upper quadrant tenderness to palpation with no hernia or mass.  No CVA tenderness.  Rectal exam did show no hemorrhoids bu or masses but did have dark maroon liquid on glove when returned.  ECG obtained on arrival showing no acute abnormality.  Lab work obtained showing WBC of 7.0 with hemoglobin of 10.2.  Unfortunately last hemoglobin was greater than 15 years ago and normal at that time.  Sodium 132 with potassium of 4.4, calcium of 8.3 and magnesium 1.9.  Renal and hepatic functions normal.  Lipase normal at 49.  INR normal at 1.11 and a APTT of 32.  TSH 0.40.  Occult stool positive.  High-sensitivity troponin 8.  2-hour repeat high-sensitivity troponin has increased to 15.  For this reason repeat ECG obtained showing sinus tachycardia with frequent PACs.  T wave abnormality in lateral leads.  When compared to earlier, ST abnormality in lateral leads has improved but T wave abnormality remains..  4-hour repeat high-sensitivity troponin conducted and was 15.  Repeat hemoglobin after IV fluids was 9.5.  CT abdomen pelvis of IV contrast conducted showing no bowel obstruction, inflammatory changes.  Incidental cystic area to pancreatic neck region and recommends MRI.  Bilateral adrenal adenomas and cystic lesion on the right ovary.  Liter LR given for hydration.  Patient did get up and go to the bathroom and became tachycardic with dizziness and lightheadedness and chest heaviness.  Tachycardia did go down to the 100's after sitting and heaviness dissipated.  Patient continued to have some dizziness/lightheadedness when getting up and moving.  Discussed admission with patient but \"family prefers to go to [place] and just in case this may be cardiac in nature\".  Did discuss that [place] " may not do anything different than here but still it was decided to have patient go to [facility].  Discussed case with Hospitalist Dr. Aceves.  Patient accepted for transfer.  Did initiate famotidine 40 mg IV and pantoprazole 80 mg IV prior to transfer.      Critical Care Time: None    Impression and plan discussed with patient. Questions answered, concerns addressed, indications for urgent re-evaluation reviewed, and  given. Patient/Parent/Caregiver agree with treatment plan and have no further questions at this time.      This document was prepared using a combination of typing and voice generated software.  While every attempt was made for accuracy, spelling and grammatical errors may exist.              New Prescriptions    No medications on file       Final diagnoses:   Chest heaviness   Shortness of breath   Lower GI bleed       4/17/2025   [facility]       Cristi Savage, SHIRIN CNP  04/17/25 [id number]

## 2025-04-18 LAB
ATRIAL RATE - MUSE: 106 BPM
DIASTOLIC BLOOD PRESSURE - MUSE: NORMAL MMHG
INTERPRETATION ECG - MUSE: NORMAL
P AXIS - MUSE: 51 DEGREES
PR INTERVAL - MUSE: 122 MS
QRS DURATION - MUSE: 92 MS
QT - MUSE: 346 MS
QTC - MUSE: 459 MS
R AXIS - MUSE: -29 DEGREES
SYSTOLIC BLOOD PRESSURE - MUSE: NORMAL MMHG
T AXIS - MUSE: 111 DEGREES
VENTRICULAR RATE- MUSE: 106 BPM

## 2025-04-18 NOTE — ED NOTES
Face to face report given with opportunity to observe patient.    Report given to ABRAM Rico RN   4/17/2025  7:09 PM

## 2025-04-19 LAB
ATRIAL RATE - MUSE: 103 BPM
DIASTOLIC BLOOD PRESSURE - MUSE: NORMAL MMHG
INTERPRETATION ECG - MUSE: NORMAL
P AXIS - MUSE: 50 DEGREES
PR INTERVAL - MUSE: 128 MS
QRS DURATION - MUSE: 90 MS
QT - MUSE: 374 MS
QTC - MUSE: 489 MS
R AXIS - MUSE: -25 DEGREES
SYSTOLIC BLOOD PRESSURE - MUSE: NORMAL MMHG
T AXIS - MUSE: 107 DEGREES
VENTRICULAR RATE- MUSE: 103 BPM

## 2025-04-29 ENCOUNTER — OFFICE VISIT (OUTPATIENT)
Dept: FAMILY MEDICINE | Facility: OTHER | Age: 80
End: 2025-04-29
Attending: FAMILY MEDICINE
Payer: COMMERCIAL

## 2025-04-29 ENCOUNTER — HOSPITAL ENCOUNTER (OUTPATIENT)
Dept: CARDIOLOGY | Facility: OTHER | Age: 80
Discharge: HOME | End: 2025-04-29
Attending: FAMILY MEDICINE
Payer: COMMERCIAL

## 2025-04-29 VITALS
BODY MASS INDEX: 24.43 KG/M2 | HEART RATE: 88 BPM | OXYGEN SATURATION: 99 % | HEIGHT: 66 IN | RESPIRATION RATE: 16 BRPM | SYSTOLIC BLOOD PRESSURE: 136 MMHG | DIASTOLIC BLOOD PRESSURE: 72 MMHG | TEMPERATURE: 97.8 F | WEIGHT: 152 LBS

## 2025-04-29 DIAGNOSIS — R60.9 EDEMA, UNSPECIFIED TYPE: Primary | ICD-10-CM

## 2025-04-29 DIAGNOSIS — D62 ACUTE BLOOD LOSS ANEMIA: ICD-10-CM

## 2025-04-29 DIAGNOSIS — R60.9 EDEMA, UNSPECIFIED TYPE: ICD-10-CM

## 2025-04-29 DIAGNOSIS — K92.1 MELENA: ICD-10-CM

## 2025-04-29 DIAGNOSIS — D62 ANEMIA DUE TO BLOOD LOSS, ACUTE: ICD-10-CM

## 2025-04-29 DIAGNOSIS — N83.201 CYST OF RIGHT OVARY: ICD-10-CM

## 2025-04-29 DIAGNOSIS — R42 DIZZINESS: ICD-10-CM

## 2025-04-29 LAB
BASOPHILS # BLD AUTO: 0 10E3/UL (ref 0–0.2)
BASOPHILS NFR BLD AUTO: 1 %
EOSINOPHIL # BLD AUTO: 0.3 10E3/UL (ref 0–0.7)
EOSINOPHIL NFR BLD AUTO: 6 %
ERYTHROCYTE [DISTWIDTH] IN BLOOD BY AUTOMATED COUNT: 13.6 % (ref 10–15)
HCT VFR BLD AUTO: 30.9 % (ref 35–47)
HGB BLD-MCNC: 10.1 G/DL (ref 11.7–15.7)
IMM GRANULOCYTES # BLD: 0 10E3/UL
IMM GRANULOCYTES NFR BLD: 0 %
LVEF ECHO: NORMAL
LYMPHOCYTES # BLD AUTO: 0.8 10E3/UL (ref 0.8–5.3)
LYMPHOCYTES NFR BLD AUTO: 17 %
MCH RBC QN AUTO: 29.7 PG (ref 26.5–33)
MCHC RBC AUTO-ENTMCNC: 32.7 G/DL (ref 31.5–36.5)
MCV RBC AUTO: 91 FL (ref 78–100)
MONOCYTES # BLD AUTO: 0.5 10E3/UL (ref 0–1.3)
MONOCYTES NFR BLD AUTO: 10 %
NEUTROPHILS # BLD AUTO: 3.3 10E3/UL (ref 1.6–8.3)
NEUTROPHILS NFR BLD AUTO: 67 %
NRBC # BLD AUTO: 0 10E3/UL
NRBC BLD AUTO-RTO: 0 /100
PLATELET # BLD AUTO: 325 10E3/UL (ref 150–450)
RBC # BLD AUTO: 3.4 10E6/UL (ref 3.8–5.2)
TSH SERPL DL<=0.005 MIU/L-ACNC: 0.56 UIU/ML (ref 0.3–4.2)
WBC # BLD AUTO: 4.9 10E3/UL (ref 4–11)

## 2025-04-29 PROCEDURE — 36415 COLL VENOUS BLD VENIPUNCTURE: CPT | Mod: ZL | Performed by: FAMILY MEDICINE

## 2025-04-29 PROCEDURE — 85004 AUTOMATED DIFF WBC COUNT: CPT | Mod: ZL | Performed by: FAMILY MEDICINE

## 2025-04-29 PROCEDURE — G0463 HOSPITAL OUTPT CLINIC VISIT: HCPCS | Mod: 25

## 2025-04-29 PROCEDURE — 84443 ASSAY THYROID STIM HORMONE: CPT | Mod: ZL | Performed by: FAMILY MEDICINE

## 2025-04-29 PROCEDURE — 93306 TTE W/DOPPLER COMPLETE: CPT

## 2025-04-29 RX ORDER — PANTOPRAZOLE SODIUM 40 MG/1
40 TABLET, DELAYED RELEASE ORAL 2 TIMES DAILY
COMMUNITY
Start: 2025-04-22

## 2025-04-29 ASSESSMENT — PAIN SCALES - GENERAL: PAINLEVEL_OUTOF10: MILD PAIN (1)

## 2025-04-29 NOTE — PROGRESS NOTES
Assessment & Plan     (R60.9) Edema, unspecified type  (primary encounter diagnosis)  Comment: Mild edema with no obvious cause.  Recent anemia with now stability of hemoglobin.  We rechecked CBC which was reassuring.  Also check TSH which was reassuring.  Echocardiogram does not show any valvular abnormality or reduction in ejection fraction.  Suggest low-salt diet, elevation of legs when not active.  Compression socks and follow-up if persistent.  Plan: TSH Reflex GH, Echocardiogram Complete,         CANCELED: Echocardiogram Complete, CANCELED:         Echocardiogram Complete    (D62) Anemia due to blood loss, acute  Comment: Hemoglobin stable when compared with discharge.  Plan: Echocardiogram Complete, CANCELED:         Echocardiogram Complete    (D62) Acute blood loss anemia  Comment: No further known bleeding.  Continue to remain off aspirin and continue with PPI for now.    (K92.1) Melena  Comment: As noted above continue with PPI.  No aspirin or NSAIDs.  Discussed quitting smoking.  She has cut back but does not want to completely cut out.  Also cut back drinking to 1 beer per day.      (N83.201) Cyst of right ovary  Comment: Discussed with patient that radiology suggest repeating this in 6 months.           MED REC REQUIRED  Post Medication Reconciliation Status: discharge medications reconciled and changed, per note/orders  Nicotine/Tobacco Cessation  She reports that she has been smoking cigarettes. She has never been exposed to tobacco smoke. She has never used smokeless tobacco.  Nicotine/Tobacco Cessation Plan  Information offered: Patient not interested at this time            Fatimah Quigley is a 79 year old, presenting for the following health issues:  Hospital F/U      4/29/2025     8:10 AM   Additional Questions   Roomed by Rosalee CRAWFORD LPN   Accompanied by self         4/29/2025     8:10 AM   Patient Reported Additional Medications   Patient reports taking the following new medications  Pantoprazole 40 mg twice daily since hospital discharge     HPI    Patient comes in to follow-up on recent hospitalization.  She initially presented to the emergency department with chest heaviness.  Was found to be tachycardic and anemic.  Workup revealed acute blood loss anemia.  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  Once stabilized she also went EUS and biopsy of the area.  Hemoglobin remained stable until discharge.  The biopsy she was told was noncancerous.  She was discharged on the fourth day with stable hemoglobin.  During this hospitalization troponin was checked a few times and ranged from 8 to a peak of 15 ng/L.  Since home she feels that she is gradually improving in terms of her energy level.  She has stayed off the aspirin and has not taken any NSAIDs.  She has noticed some bilateral swelling in her lower extremities.  She does not think she has had any high salt meals.  She does not add salt.  She also has had bilateral hands falling asleep and occasional headache that she describes as migratory and not intractable.  Is not associated with nausea, vomiting, double vision or balance issues.  She has not had any falls.    She does continue to drink 1 beer per day and smokes 1/2 pack/day.  She has cut back on both.  She is eating well.  Ate liver and onions upon return home and has had high iron food now for the past several days.          Hospital Follow-up Visit:    Hospital/Nursing Home/IP Rehab Facility:  [facility]   Date of Admission: 4/18/2025  Date of Discharge: 4/22/2025  Reason(s) for Admission: Acute blood loss anemia with melanotic stools.  Pancreatic lesion and right ovarian cyst  Was the patient in the ICU or did the patient experience delirium during hospitalization?  No  Do you have any other stressors you would like to discuss with your provider? No    Problems taking medications regularly:  " None  Medication changes since discharge: Pantoprazole 40 mg, one twice daily   Problems adhering to non-medication therapy:  None    Summary of hospitalization:  CareEverywhere information obtained and reviewed  Diagnostic Tests/Treatments reviewed.  Follow up needed: Hemoglobin now.  Right ovarian ultrasound 6 months  Other Healthcare Providers Involved in Patient s Care:         Gastroenterology  Update since discharge: improved.         Plan of care communicated with patient                       Objective    /72 (BP Location: Right arm, Patient Position: Chair, Cuff Size: Adult Regular)   Pulse 88   Temp 97.8  F (36.6  C) (Tympanic)   Resp 16   Ht 1.676 m (5' 6\")   Wt 68.9 kg (152 lb)   SpO2 99%   Breastfeeding No   BMI 24.53 kg/m    Body mass index is 24.53 kg/m .  Physical Exam   GENERAL: alert and no distress  EYES: Eyes grossly normal to inspection, PERRL and conjunctivae and sclerae normal  NECK: no adenopathy, no asymmetry  RESP: lungs clear to auscultation - no rales, rhonchi or wheezes  CV: regular rate and rhythm, normal S1 S2, no S3 or S4, no murmur, click or rub, 1+ bilateral lower extremity edema up to mid shin.  ABDOMEN: soft, no hepatosplenomegaly, no masses and bowel sounds normal.  Does have mild tenderness on far left side of abdomen but otherwise nontender.                Signed Electronically by: Tristen Morales MD    "

## 2025-04-29 NOTE — NURSING NOTE
"Chief Complaint   Patient presents with    Hospital F/U       Initial /72 (BP Location: Right arm, Patient Position: Chair, Cuff Size: Adult Regular)   Pulse 88   Temp 97.8  F (36.6  C) (Tympanic)   Resp 16   Ht 1.676 m (5' 6\")   Wt 68.9 kg (152 lb)   SpO2 99%   Breastfeeding No   BMI 24.53 kg/m   Estimated body mass index is 24.53 kg/m  as calculated from the following:    Height as of this encounter: 1.676 m (5' 6\").    Weight as of this encounter: 68.9 kg (152 lb).      Medication Reconciliation: Complete.       Rosalee Purvis LPN on 4/29/2025 at 8:27 AM     "

## 2025-05-12 ENCOUNTER — RESULTS FOLLOW-UP (OUTPATIENT)
Dept: FAMILY MEDICINE | Facility: OTHER | Age: 80
End: 2025-05-12

## 2025-05-12 ENCOUNTER — OFFICE VISIT (OUTPATIENT)
Dept: FAMILY MEDICINE | Facility: OTHER | Age: 80
End: 2025-05-12
Attending: STUDENT IN AN ORGANIZED HEALTH CARE EDUCATION/TRAINING PROGRAM
Payer: COMMERCIAL

## 2025-05-12 VITALS
DIASTOLIC BLOOD PRESSURE: 60 MMHG | TEMPERATURE: 98.5 F | WEIGHT: 150 LBS | OXYGEN SATURATION: 96 % | SYSTOLIC BLOOD PRESSURE: 120 MMHG | RESPIRATION RATE: 18 BRPM | BODY MASS INDEX: 24.21 KG/M2 | HEART RATE: 72 BPM

## 2025-05-12 DIAGNOSIS — R00.2 PALPITATIONS: Primary | ICD-10-CM

## 2025-05-12 DIAGNOSIS — I49.3 PVC'S (PREMATURE VENTRICULAR CONTRACTIONS): ICD-10-CM

## 2025-05-12 DIAGNOSIS — K57.30 DIVERTICULAR DISEASE OF COLON: ICD-10-CM

## 2025-05-12 DIAGNOSIS — D64.9 FATIGUE ASSOCIATED WITH ANEMIA: ICD-10-CM

## 2025-05-12 PROBLEM — N83.201 RIGHT OVARIAN CYST: Status: ACTIVE | Noted: 2025-04-18

## 2025-05-12 PROBLEM — D62 ACUTE BLOOD LOSS ANEMIA: Status: ACTIVE | Noted: 2025-04-18

## 2025-05-12 LAB
BASOPHILS # BLD AUTO: 0.1 10E3/UL (ref 0–0.2)
BASOPHILS NFR BLD AUTO: 1 %
EOSINOPHIL # BLD AUTO: 0.3 10E3/UL (ref 0–0.7)
EOSINOPHIL NFR BLD AUTO: 7 %
ERYTHROCYTE [DISTWIDTH] IN BLOOD BY AUTOMATED COUNT: 14.6 % (ref 10–15)
FERRITIN SERPL-MCNC: 18 NG/ML (ref 11–328)
HCT VFR BLD AUTO: 31.8 % (ref 35–47)
HGB BLD-MCNC: 10.4 G/DL (ref 11.7–15.7)
IMM GRANULOCYTES # BLD: 0 10E3/UL
IMM GRANULOCYTES NFR BLD: 0 %
IRON BINDING CAPACITY (ROCHE): 389 UG/DL (ref 240–430)
IRON SATN MFR SERPL: 16 % (ref 15–46)
IRON SERPL-MCNC: 64 UG/DL (ref 37–145)
LYMPHOCYTES # BLD AUTO: 1.5 10E3/UL (ref 0.8–5.3)
LYMPHOCYTES NFR BLD AUTO: 30 %
MCH RBC QN AUTO: 28.7 PG (ref 26.5–33)
MCHC RBC AUTO-ENTMCNC: 32.7 G/DL (ref 31.5–36.5)
MCV RBC AUTO: 88 FL (ref 78–100)
MONOCYTES # BLD AUTO: 0.5 10E3/UL (ref 0–1.3)
MONOCYTES NFR BLD AUTO: 9 %
NEUTROPHILS # BLD AUTO: 2.8 10E3/UL (ref 1.6–8.3)
NEUTROPHILS NFR BLD AUTO: 53 %
NRBC # BLD AUTO: 0 10E3/UL
NRBC BLD AUTO-RTO: 0 /100
PLATELET # BLD AUTO: 247 10E3/UL (ref 150–450)
RBC # BLD AUTO: 3.62 10E6/UL (ref 3.8–5.2)
WBC # BLD AUTO: 5.2 10E3/UL (ref 4–11)

## 2025-05-12 PROCEDURE — G0463 HOSPITAL OUTPT CLINIC VISIT: HCPCS

## 2025-05-12 PROCEDURE — 36415 COLL VENOUS BLD VENIPUNCTURE: CPT | Mod: ZL | Performed by: STUDENT IN AN ORGANIZED HEALTH CARE EDUCATION/TRAINING PROGRAM

## 2025-05-12 PROCEDURE — 82728 ASSAY OF FERRITIN: CPT | Mod: ZL | Performed by: STUDENT IN AN ORGANIZED HEALTH CARE EDUCATION/TRAINING PROGRAM

## 2025-05-12 PROCEDURE — 85025 COMPLETE CBC W/AUTO DIFF WBC: CPT | Mod: ZL | Performed by: STUDENT IN AN ORGANIZED HEALTH CARE EDUCATION/TRAINING PROGRAM

## 2025-05-12 PROCEDURE — 83540 ASSAY OF IRON: CPT | Mod: ZL | Performed by: STUDENT IN AN ORGANIZED HEALTH CARE EDUCATION/TRAINING PROGRAM

## 2025-05-12 ASSESSMENT — PAIN SCALES - GENERAL: PAINLEVEL_OUTOF10: NO PAIN (0)

## 2025-05-12 NOTE — PROGRESS NOTES
Assessment & Plan     Palpitations  Intermittent pounding of the heart, more pronounced when she is lying down  Looking back, she has had an ECG that did demonstrate PVC  On my exam, I appreciate regular rate and rhythm with frequent PVCs  Will obtain ziopatch, 7 days duration, to assess further  - ZIO PATCH 3-7 DAYS (additional cost to patient)  - ZIO PATCH 3-7 DAYS APPLICATION    PVC's (premature ventricular contractions)  As above  - ZIO PATCH 3-7 DAYS (additional cost to patient)  - ZIO PATCH 3-7 DAYS APPLICATION    Diverticular disease of colon  Has LLQ residual sensation but does   - Ferritin; Future  - Iron and iron binding capacity; Future  - CBC with platelets and differential; Future    Fatigue associated with anemia  Anemia from diverticular bleed.  Will check ferritin in addition to CBC to assess for iron storage.  Possibly implicated in PVCs?  - Ferritin; Future  - Iron and iron binding capacity; Future        Subjective   Soraida is a 79 year old, presenting for the following health issues:  Hematology (Hgb problems) and Atrial Fib        5/12/2025    12:44 PM   Additional Questions   Roomed by LEYDI Hamlin CMA   Accompanied by Self         5/12/2025    12:44 PM   Patient Reported Additional Medications   Patient reports taking the following new medications New patient     HPI      Patient here for recheck on anemia.  Has Establish Care next month.  Previous GI bleed.      Something feels off and I know it.  Has oximeter and never had heart rate in 40s.   Feeling palpitations with mild activity  A walk from the house to garage will trigger  In [place] celebrating huseyin's 19st birthday.  She danced with her 21 year old grandson and after a few minutes of dancing, felt short of breath and had palpitations  For the last 20 years, has been taking 500 mg ASA every day  Had been last month and     1/2 times over the last few weeks- HR goes down to 38 and 40s- was sitting down- walking and felt wonky.         Review of Systems  Constitutional, HEENT, cardiovascular, pulmonary, GI, , musculoskeletal, neuro, skin, endocrine and psych systems are negative, except as otherwise noted.      Objective    /60   Pulse 72   Temp 98.5  F (36.9  C) (Tympanic)   Resp 18   Wt 68 kg (150 lb)   SpO2 96%   BMI 24.21 kg/m    Body mass index is 24.21 kg/m .  Physical Exam   GENERAL: alert and no distress  EYES: Eyes grossly normal to inspection, PERRL and conjunctivae and sclerae normal  HENT: ear canals and TM's normal, nose and mouth without ulcers or lesions  NECK: no adenopathy, no asymmetry, masses, or scars  RESP: lungs clear to auscultation - no rales, rhonchi or wheezes  CV: regular rate and rhythm, normal S1 S2, no S3 or S4, no murmur, click or rub, no peripheral edema  ABDOMEN: soft, nontender, no hepatosplenomegaly, no masses and bowel sounds normal  MS: no gross musculoskeletal defects noted, no edema  SKIN: no suspicious lesions or rashes  NEURO: Normal strength and tone, mentation intact and speech normal  PSYCH: mentation appears normal, affect normal/bright          Signed Electronically by: Mikaela Crowley MD

## 2025-05-13 ENCOUNTER — ALLIED HEALTH/NURSE VISIT (OUTPATIENT)
Dept: FAMILY MEDICINE | Facility: OTHER | Age: 80
End: 2025-05-13
Attending: STUDENT IN AN ORGANIZED HEALTH CARE EDUCATION/TRAINING PROGRAM
Payer: COMMERCIAL

## 2025-05-13 DIAGNOSIS — R00.2 PALPITATIONS: ICD-10-CM

## 2025-05-13 DIAGNOSIS — I49.3 PVC'S (PREMATURE VENTRICULAR CONTRACTIONS): ICD-10-CM

## 2025-05-13 PROCEDURE — 93242 EXT ECG>48HR<7D RECORDING: CPT

## 2025-05-13 NOTE — PROGRESS NOTES
Soraida Lynn arrived here on 5/13/2025 2:51 PM for 3-7 Days  Zio monitor placement per ordering provider Dr. Crowley for the diagnosis PVCs.  Patient s skin was prepped per protocol. Dr. Pennington is the supervising MD.  Zio monitor was placed.  Instructions were reviewed with and given to the patient.  Patient verbalized understanding of wear, troubleshooting and monitor return instructions.

## 2025-05-14 ENCOUNTER — TELEPHONE (OUTPATIENT)
Dept: INFUSION THERAPY | Facility: OTHER | Age: 80
End: 2025-05-14

## 2025-05-14 DIAGNOSIS — D62 ACUTE BLOOD LOSS ANEMIA: Primary | ICD-10-CM

## 2025-05-14 RX ORDER — DIPHENHYDRAMINE HYDROCHLORIDE 50 MG/ML
50 INJECTION, SOLUTION INTRAMUSCULAR; INTRAVENOUS
Start: 2025-05-14

## 2025-05-14 RX ORDER — ALBUTEROL SULFATE 90 UG/1
1-2 INHALANT RESPIRATORY (INHALATION)
Start: 2025-05-14

## 2025-05-14 RX ORDER — DIPHENHYDRAMINE HYDROCHLORIDE 50 MG/ML
25 INJECTION, SOLUTION INTRAMUSCULAR; INTRAVENOUS
Start: 2025-05-14

## 2025-05-14 RX ORDER — HEPARIN SODIUM (PORCINE) LOCK FLUSH IV SOLN 100 UNIT/ML 100 UNIT/ML
5 SOLUTION INTRAVENOUS
OUTPATIENT
Start: 2025-05-14

## 2025-05-14 RX ORDER — EPINEPHRINE 1 MG/ML
0.3 INJECTION, SOLUTION INTRAMUSCULAR; SUBCUTANEOUS EVERY 5 MIN PRN
OUTPATIENT
Start: 2025-05-14

## 2025-05-14 RX ORDER — METHYLPREDNISOLONE SODIUM SUCCINATE 40 MG/ML
40 INJECTION INTRAMUSCULAR; INTRAVENOUS
Start: 2025-05-14

## 2025-05-14 RX ORDER — ALBUTEROL SULFATE 0.83 MG/ML
2.5 SOLUTION RESPIRATORY (INHALATION)
OUTPATIENT
Start: 2025-05-14

## 2025-05-14 RX ORDER — HEPARIN SODIUM,PORCINE 10 UNIT/ML
5-20 VIAL (ML) INTRAVENOUS DAILY PRN
OUTPATIENT
Start: 2025-05-14

## 2025-05-14 RX ORDER — MEPERIDINE HYDROCHLORIDE 25 MG/ML
25 INJECTION INTRAMUSCULAR; INTRAVENOUS; SUBCUTANEOUS
OUTPATIENT
Start: 2025-05-14

## 2025-05-14 NOTE — NURSING NOTE
See Jocelyne RN and provider discussion re provider order for Venofer 200mg IV for 4-5 doses. Therapy plan entered, routed to provider to sign. Advised plan will not allow dose range, left at 5 and advised how to cancel/discontinue plan if 5th dose isn't needed.

## 2025-05-20 ENCOUNTER — TELEPHONE (OUTPATIENT)
Dept: FAMILY MEDICINE | Facility: OTHER | Age: 80
End: 2025-05-20

## 2025-05-20 DIAGNOSIS — D62 ACUTE BLOOD LOSS ANEMIA: Primary | ICD-10-CM

## 2025-05-20 RX ORDER — MEPERIDINE HYDROCHLORIDE 25 MG/ML
25 INJECTION INTRAMUSCULAR; INTRAVENOUS; SUBCUTANEOUS
Status: CANCELLED | OUTPATIENT
Start: 2025-05-20

## 2025-05-20 RX ORDER — DIPHENHYDRAMINE HYDROCHLORIDE 50 MG/ML
25 INJECTION, SOLUTION INTRAMUSCULAR; INTRAVENOUS
Status: CANCELLED
Start: 2025-05-20

## 2025-05-20 RX ORDER — METHYLPREDNISOLONE SODIUM SUCCINATE 40 MG/ML
40 INJECTION INTRAMUSCULAR; INTRAVENOUS
Status: CANCELLED
Start: 2025-05-20

## 2025-05-20 RX ORDER — EPINEPHRINE 1 MG/ML
0.3 INJECTION, SOLUTION INTRAMUSCULAR; SUBCUTANEOUS EVERY 5 MIN PRN
Status: CANCELLED | OUTPATIENT
Start: 2025-05-20

## 2025-05-20 RX ORDER — ALBUTEROL SULFATE 90 UG/1
1-2 INHALANT RESPIRATORY (INHALATION)
Status: CANCELLED
Start: 2025-05-20

## 2025-05-20 RX ORDER — DIPHENHYDRAMINE HYDROCHLORIDE 50 MG/ML
50 INJECTION, SOLUTION INTRAMUSCULAR; INTRAVENOUS
Status: CANCELLED
Start: 2025-05-20

## 2025-05-20 RX ORDER — ALBUTEROL SULFATE 0.83 MG/ML
2.5 SOLUTION RESPIRATORY (INHALATION)
Status: CANCELLED | OUTPATIENT
Start: 2025-05-20

## 2025-05-20 RX ORDER — HEPARIN SODIUM,PORCINE 10 UNIT/ML
5-20 VIAL (ML) INTRAVENOUS DAILY PRN
Status: CANCELLED | OUTPATIENT
Start: 2025-05-20

## 2025-05-20 RX ORDER — HEPARIN SODIUM (PORCINE) LOCK FLUSH IV SOLN 100 UNIT/ML 100 UNIT/ML
5 SOLUTION INTRAVENOUS
Status: CANCELLED | OUTPATIENT
Start: 2025-05-20

## 2025-05-20 NOTE — TELEPHONE ENCOUNTER
Patient iron was changed to Infed per benefits recommendation. However, I do not see a history of receiving infed here before, which would mean she should have a test dose and the accompanying 1 hour monitoring period ordered.    Did she receive infed outside our facility in the past? If so, we are ok as plan stands. If not, a new plan would need to be entered, and keep the test dose/monitoring period, and signed. Please advise.

## 2025-05-20 NOTE — TELEPHONE ENCOUNTER
Response from provider that new plan was entered, test dose now ordered. Call to pharmacy, they have drug. Call to  Radha to increase level to 4.5 and cancel all other appts.

## 2025-05-20 NOTE — TELEPHONE ENCOUNTER
----- Message from Daja IZAGUIRRE sent at 5/20/2025  8:06 AM CDT -----  Regarding: RE: non preferred appt 5/21/25  Hello,    Are you able to provide update for request below?    Patient has an appt tomorrow and we pay need to postpone if we are unable to switch to preferred iron product.    Thank you  ----- Message -----  From: Daja Lopez  Sent: 5/19/2025   8:28 AM CDT  To: Mikaela Crowley MD; Radha Benz MA; Jocelyne#  Subject: non preferred appt 5/21/25                       Hello,      We are attempting to secure coverage for Venofer. Based on the patients Medicare Advantage plan, patient must have a CKD diagnosis to receive Venofer. Unfortunately  based on their insurance the only options would be Infed, Feraheme or Injectafer    Are you able to switch to one of the preferred irons, If not, please let us know and we will advise on next steps for coverage.     Thank you  ----- Message -----  From: Daja Lopez  Sent: 5/19/2025   8:28 AM CDT  To: Mikaela Crowley MD; Radha Benz MA; Jocelyne#  Subject: non preferred appt 5/21/25                       Hello,      We are attempting to secure coverage for Venofer. Based on the patients Medicare Advantage plan, patient must have a CKD diagnosis to receive Venofer. Unfortunately  based on their insurance the only options would be Infed, Feraheme or Injectafer    Are you able to switch to one of the preferred irons, If not, please let us know and we will advise on next steps for coverage.     Thank you

## 2025-05-21 ENCOUNTER — INFUSION THERAPY VISIT (OUTPATIENT)
Dept: INFUSION THERAPY | Facility: OTHER | Age: 80
End: 2025-05-21
Attending: STUDENT IN AN ORGANIZED HEALTH CARE EDUCATION/TRAINING PROGRAM
Payer: COMMERCIAL

## 2025-05-21 VITALS
DIASTOLIC BLOOD PRESSURE: 60 MMHG | SYSTOLIC BLOOD PRESSURE: 124 MMHG | RESPIRATION RATE: 16 BRPM | HEIGHT: 65 IN | WEIGHT: 150.4 LBS | HEART RATE: 71 BPM | OXYGEN SATURATION: 98 % | BODY MASS INDEX: 25.06 KG/M2 | TEMPERATURE: 98.2 F

## 2025-05-21 DIAGNOSIS — D62 ACUTE BLOOD LOSS ANEMIA: Primary | ICD-10-CM

## 2025-05-21 PROCEDURE — 250N000011 HC RX IP 250 OP 636: Mod: JZ | Performed by: STUDENT IN AN ORGANIZED HEALTH CARE EDUCATION/TRAINING PROGRAM

## 2025-05-21 RX ORDER — DIPHENHYDRAMINE HYDROCHLORIDE 50 MG/ML
50 INJECTION, SOLUTION INTRAMUSCULAR; INTRAVENOUS
Status: CANCELLED
Start: 2025-05-21

## 2025-05-21 RX ORDER — HEPARIN SODIUM,PORCINE 10 UNIT/ML
5-20 VIAL (ML) INTRAVENOUS DAILY PRN
Status: CANCELLED | OUTPATIENT
Start: 2025-05-21

## 2025-05-21 RX ORDER — ALBUTEROL SULFATE 0.83 MG/ML
2.5 SOLUTION RESPIRATORY (INHALATION)
Status: CANCELLED | OUTPATIENT
Start: 2025-05-21

## 2025-05-21 RX ORDER — HEPARIN SODIUM (PORCINE) LOCK FLUSH IV SOLN 100 UNIT/ML 100 UNIT/ML
5 SOLUTION INTRAVENOUS
Status: CANCELLED | OUTPATIENT
Start: 2025-05-21

## 2025-05-21 RX ORDER — MEPERIDINE HYDROCHLORIDE 25 MG/ML
25 INJECTION INTRAMUSCULAR; INTRAVENOUS; SUBCUTANEOUS
Status: CANCELLED | OUTPATIENT
Start: 2025-05-21

## 2025-05-21 RX ORDER — EPINEPHRINE 1 MG/ML
0.3 INJECTION, SOLUTION, CONCENTRATE INTRAVENOUS EVERY 5 MIN PRN
Status: CANCELLED | OUTPATIENT
Start: 2025-05-21

## 2025-05-21 RX ORDER — DIPHENHYDRAMINE HYDROCHLORIDE 50 MG/ML
25 INJECTION, SOLUTION INTRAMUSCULAR; INTRAVENOUS
Status: CANCELLED
Start: 2025-05-21

## 2025-05-21 RX ORDER — ALBUTEROL SULFATE 90 UG/1
1-2 INHALANT RESPIRATORY (INHALATION)
Status: CANCELLED
Start: 2025-05-21

## 2025-05-21 RX ORDER — METHYLPREDNISOLONE SODIUM SUCCINATE 40 MG/ML
40 INJECTION INTRAMUSCULAR; INTRAVENOUS
Status: CANCELLED
Start: 2025-05-21

## 2025-05-21 RX ADMIN — IRON DEXTRAN 475 MG: 50 INJECTION INTRAMUSCULAR; INTRAVENOUS at 13:41

## 2025-05-21 RX ADMIN — Medication 250 ML: at 11:42

## 2025-05-21 RX ADMIN — IRON DEXTRAN 25 MG: 50 INJECTION INTRAMUSCULAR; INTRAVENOUS at 11:47

## 2025-05-21 ASSESSMENT — PAIN SCALES - GENERAL: PAINLEVEL_OUTOF10: NO PAIN (0)

## 2025-05-21 NOTE — PROGRESS NOTES
Infusion Nursing Note:  Soraida KENYA Contrerass presents today for Infed.    Patient seen by provider today: No   present during visit today: Not Applicable.    Note: Pt reports currently working with her provider on her HR being variable (). Just completed a Zio and has had an Echo.      Intravenous Access:  Peripheral IV placed per Zakia CHAND.    Treatment Conditions:  Not Applicable.      Post Infusion Assessment:  Patient tolerated infusion without incident.  Patient observed for 1 hour minutes post Infed test dose per protocol.  Site patent and intact, free from redness, edema or discomfort.  No evidence of extravasations.  Access discontinued per protocol.       Discharge Plan:   Patient discharged in stable condition accompanied by: self.  Departure Mode: Ambulatory.

## 2025-05-27 ENCOUNTER — TELEPHONE (OUTPATIENT)
Dept: FAMILY MEDICINE | Facility: OTHER | Age: 80
End: 2025-05-27

## 2025-05-27 LAB — CV ZIO PRELIM RESULTS: NORMAL

## 2025-05-27 NOTE — TELEPHONE ENCOUNTER
Radha, I reviewed the ziopatch, please reassure that there is nothing life threatening.  I am pretty full tomorrow but I can see her on Friday?  Otherwise ok to keep 06/06

## 2025-05-27 NOTE — TELEPHONE ENCOUNTER
The heart monitor has not been reviewed yet by Dr Crowley.   Forwarded for her to review and advise on appointment needs.   Already scheduled for 6/6/2025 with her.

## 2025-05-27 NOTE — TELEPHONE ENCOUNTER
2:24 PM    Reason for Call: OVERBOOK    Patient is wanting to discuss the results from the heart monitor that pt was wearing. Pt states she got the results back today pt says they concerning      The patient is requesting an appointment for soon with Dr. Crowley.    Was an appointment offered for this call? No  If yes : Appointment type              Date    Preferred method for responding to this message: Telephone Call  What is your phone number ?   [phone]    If we cannot reach you directly, may we leave a detailed response at the number you provided? Yes    Can this message wait until your PCP/provider returns, if unavailable today? Not applicable    Lilli Montoya

## 2025-05-27 NOTE — TELEPHONE ENCOUNTER
Called patient and reassured her that the monitor results are reassuring and appointment was not needed sooner    Talking with her further, though, she has some lower abd pain in the location where she had a recent bleed. She states she has had one infusion and no further appts are set for infusions.     Because of this I did set her up to arrive at 1115 for an 1130 appointment on Friday.

## 2025-05-30 ENCOUNTER — RESULTS FOLLOW-UP (OUTPATIENT)
Dept: FAMILY MEDICINE | Facility: OTHER | Age: 80
End: 2025-05-30

## 2025-05-30 ENCOUNTER — OFFICE VISIT (OUTPATIENT)
Dept: FAMILY MEDICINE | Facility: OTHER | Age: 80
End: 2025-05-30
Attending: STUDENT IN AN ORGANIZED HEALTH CARE EDUCATION/TRAINING PROGRAM
Payer: COMMERCIAL

## 2025-05-30 VITALS
TEMPERATURE: 98.9 F | BODY MASS INDEX: 25.49 KG/M2 | HEIGHT: 65 IN | SYSTOLIC BLOOD PRESSURE: 106 MMHG | DIASTOLIC BLOOD PRESSURE: 60 MMHG | RESPIRATION RATE: 20 BRPM | OXYGEN SATURATION: 96 % | WEIGHT: 153 LBS | HEART RATE: 73 BPM

## 2025-05-30 DIAGNOSIS — I49.3 PVC'S (PREMATURE VENTRICULAR CONTRACTIONS): ICD-10-CM

## 2025-05-30 DIAGNOSIS — D50.9 IRON DEFICIENCY ANEMIA, UNSPECIFIED IRON DEFICIENCY ANEMIA TYPE: Primary | ICD-10-CM

## 2025-05-30 LAB
BASOPHILS # BLD AUTO: 0 10E3/UL (ref 0–0.2)
BASOPHILS NFR BLD AUTO: 1 %
EOSINOPHIL # BLD AUTO: 0.3 10E3/UL (ref 0–0.7)
EOSINOPHIL NFR BLD AUTO: 6 %
ERYTHROCYTE [DISTWIDTH] IN BLOOD BY AUTOMATED COUNT: 16.2 % (ref 10–15)
HCT VFR BLD AUTO: 38.2 % (ref 35–47)
HGB BLD-MCNC: 12.2 G/DL (ref 11.7–15.7)
IMM GRANULOCYTES # BLD: 0 10E3/UL
IMM GRANULOCYTES NFR BLD: 0 %
LYMPHOCYTES # BLD AUTO: 1.4 10E3/UL (ref 0.8–5.3)
LYMPHOCYTES NFR BLD AUTO: 28 %
MCH RBC QN AUTO: 27.6 PG (ref 26.5–33)
MCHC RBC AUTO-ENTMCNC: 31.9 G/DL (ref 31.5–36.5)
MCV RBC AUTO: 86 FL (ref 78–100)
MONOCYTES # BLD AUTO: 0.4 10E3/UL (ref 0–1.3)
MONOCYTES NFR BLD AUTO: 9 %
NEUTROPHILS # BLD AUTO: 2.9 10E3/UL (ref 1.6–8.3)
NEUTROPHILS NFR BLD AUTO: 57 %
NRBC # BLD AUTO: 0 10E3/UL
NRBC BLD AUTO-RTO: 0 /100
PLATELET # BLD AUTO: 239 10E3/UL (ref 150–450)
RBC # BLD AUTO: 4.42 10E6/UL (ref 3.8–5.2)
WBC # BLD AUTO: 5 10E3/UL (ref 4–11)

## 2025-05-30 PROCEDURE — 1126F AMNT PAIN NOTED NONE PRSNT: CPT | Performed by: STUDENT IN AN ORGANIZED HEALTH CARE EDUCATION/TRAINING PROGRAM

## 2025-05-30 PROCEDURE — 85025 COMPLETE CBC W/AUTO DIFF WBC: CPT | Mod: ZL | Performed by: STUDENT IN AN ORGANIZED HEALTH CARE EDUCATION/TRAINING PROGRAM

## 2025-05-30 PROCEDURE — G0463 HOSPITAL OUTPT CLINIC VISIT: HCPCS

## 2025-05-30 PROCEDURE — 99213 OFFICE O/P EST LOW 20 MIN: CPT | Performed by: STUDENT IN AN ORGANIZED HEALTH CARE EDUCATION/TRAINING PROGRAM

## 2025-05-30 PROCEDURE — 36415 COLL VENOUS BLD VENIPUNCTURE: CPT | Mod: ZL | Performed by: STUDENT IN AN ORGANIZED HEALTH CARE EDUCATION/TRAINING PROGRAM

## 2025-05-30 PROCEDURE — 3074F SYST BP LT 130 MM HG: CPT | Performed by: STUDENT IN AN ORGANIZED HEALTH CARE EDUCATION/TRAINING PROGRAM

## 2025-05-30 PROCEDURE — 3078F DIAST BP <80 MM HG: CPT | Performed by: STUDENT IN AN ORGANIZED HEALTH CARE EDUCATION/TRAINING PROGRAM

## 2025-05-30 ASSESSMENT — PAIN SCALES - GENERAL: PAINLEVEL_OUTOF10: NO PAIN (0)

## 2025-05-30 NOTE — PROGRESS NOTES
"  Assessment & Plan     Iron deficiency anemia, unspecified iron deficiency anemia type  Repeat labs done today to track anemia.  It looks like HGB is back into the normal range  Recommend to continue to observe for any symptoms- CP, SOB, palpitations, dizziness, lightheadedness and weakness and to check stools for any cheo blood or melena.    No abdominal pain today and abdominal exam is benign  - CBC with platelets and differential; Future  - CBC with platelets and differential    PVCs  Clinically stable.  Patient denies any concerns today      BMI  Estimated body mass index is 25.43 kg/m  as calculated from the following:    Height as of this encounter: 1.652 m (5' 5.04\").    Weight as of this encounter: 69.4 kg (153 lb).           Fatimah Quigley is a 80 year old, presenting for the following health issues:  Anemia        5/30/2025    11:19 AM   Additional Questions   Roomed by Karina CHAND   Accompanied by self     Anemia    History of Present Illness       Vascular Disease:  She presents for follow up of vascular disease.     She never takes nitroglycerin. She is not taking daily aspirin.    Reason for visit:  Heart    She eats 2-3 servings of fruits and vegetables daily.She consumes 0 sweetened beverage(s) daily.She exercises with enough effort to increase her heart rate 9 or less minutes per day.  She exercises with enough effort to increase her heart rate 3 or less days per week.   She is taking medications regularly.      Patient stated that the area that was bleeding inside still has an ache that seems to be getting a little worse. Was sitting for house in a car going to [facility] and back which made it worse.      Dizzy and felt weird all day yesterday  Walking from house to garage, would get short of breath  She tells me she hasn't felt this bad all of her life  Had iron infusion one week ago.    Doesn't feel any different after iron infusion  Still has ache on her left side- had this ache for several " "months.  Ache sometimes wraps around to her back    Concern - anemia   Onset: April   Description: heart monitor results came back Tuesday which showed SVT, since episode with bleeding in hospital it has been bothering here, has been short of breath, tired, lightheaded, dizzy   Intensity: moderate  Progression of Symptoms:  worsening  Accompanying Signs & Symptoms: dizzy, lightheaded, short of breath, tired   Previous history of similar problem: no   Precipitating factors:        Worsened by: walking, activity.   Alleviating factors:        Improved by: rest and sleep   Therapies tried and outcome: coughing, holding breath and pushing hard.         Review of Systems  Constitutional, HEENT, cardiovascular, pulmonary, GI, , musculoskeletal, neuro, skin, endocrine and psych systems are negative, except as otherwise noted.      Objective    /60 (BP Location: Right arm, Patient Position: Sitting, Cuff Size: Adult Regular)   Pulse 73   Temp 98.9  F (37.2  C) (Tympanic)   Resp 20   Ht 1.652 m (5' 5.04\")   Wt 69.4 kg (153 lb)   SpO2 96%   BMI 25.43 kg/m    Body mass index is 25.43 kg/m .  Physical Exam   GENERAL: alert and no distress  EYES: Eyes grossly normal to inspection, PERRL and conjunctivae and sclerae normal  HENT: ear canals and TM's normal, nose and mouth without ulcers or lesions  NECK: no adenopathy, no asymmetry, masses, or scars  RESP: lungs clear to auscultation - no rales, rhonchi or wheezes  CV: regular rate and rhythm, normal S1 S2, no S3 or S4, no murmur, click or rub, no peripheral edema  ABDOMEN: soft, nontender, no hepatosplenomegaly, no masses and bowel sounds normal  MS: no gross musculoskeletal defects noted, no edema  SKIN: no suspicious lesions or rashes  NEURO: Normal strength and tone, mentation intact and speech normal  PSYCH: mentation appears normal, affect normal/bright          Signed Electronically by: Mikaela Crowley MD    "

## 2025-06-06 ENCOUNTER — MYC MEDICAL ADVICE (OUTPATIENT)
Dept: FAMILY MEDICINE | Facility: OTHER | Age: 80
End: 2025-06-06

## 2025-06-06 DIAGNOSIS — E53.8 VITAMIN B12 DEFICIENCY (NON ANEMIC): Primary | ICD-10-CM

## 2025-06-09 RX ORDER — CYANOCOBALAMIN 1000 UG/ML
1000 INJECTION, SOLUTION INTRAMUSCULAR; SUBCUTANEOUS
Status: ACTIVE | OUTPATIENT
Start: 2025-06-09 | End: 2026-06-04

## 2025-06-11 ENCOUNTER — ALLIED HEALTH/NURSE VISIT (OUTPATIENT)
Dept: FAMILY MEDICINE | Facility: OTHER | Age: 80
End: 2025-06-11
Attending: STUDENT IN AN ORGANIZED HEALTH CARE EDUCATION/TRAINING PROGRAM
Payer: COMMERCIAL

## 2025-06-11 DIAGNOSIS — E53.8 VITAMIN B12 DEFICIENCY (NON ANEMIC): Primary | ICD-10-CM

## 2025-06-11 PROCEDURE — 250N000011 HC RX IP 250 OP 636: Performed by: STUDENT IN AN ORGANIZED HEALTH CARE EDUCATION/TRAINING PROGRAM

## 2025-06-11 PROCEDURE — 96372 THER/PROPH/DIAG INJ SC/IM: CPT | Performed by: STUDENT IN AN ORGANIZED HEALTH CARE EDUCATION/TRAINING PROGRAM

## 2025-06-11 RX ADMIN — CYANOCOBALAMIN 1000 MCG: 1000 INJECTION, SOLUTION INTRAMUSCULAR; SUBCUTANEOUS at 13:52

## 2025-07-15 ENCOUNTER — HOSPITAL ENCOUNTER (EMERGENCY)
Facility: HOSPITAL | Age: 80
Discharge: HOME | End: 2025-07-15
Attending: FAMILY MEDICINE
Payer: COMMERCIAL

## 2025-07-15 VITALS
TEMPERATURE: 98.9 F | OXYGEN SATURATION: 97 % | HEART RATE: 80 BPM | WEIGHT: 148.04 LBS | DIASTOLIC BLOOD PRESSURE: 67 MMHG | RESPIRATION RATE: 18 BRPM | SYSTOLIC BLOOD PRESSURE: 141 MMHG | BODY MASS INDEX: 24.6 KG/M2

## 2025-07-15 DIAGNOSIS — R10.2 SUPRAPUBIC ABDOMINAL PAIN: ICD-10-CM

## 2025-07-15 DIAGNOSIS — N30.01 ACUTE CYSTITIS WITH HEMATURIA: ICD-10-CM

## 2025-07-15 LAB
ALBUMIN SERPL BCG-MCNC: 4 G/DL (ref 3.5–5.2)
ALBUMIN UR-MCNC: 20 MG/DL
ALP SERPL-CCNC: 98 U/L (ref 40–150)
ALT SERPL W P-5'-P-CCNC: 25 U/L (ref 0–50)
ANION GAP SERPL CALCULATED.3IONS-SCNC: 14 MMOL/L (ref 7–15)
APPEARANCE UR: ABNORMAL
AST SERPL W P-5'-P-CCNC: 23 U/L (ref 0–45)
BACTERIA #/AREA URNS HPF: ABNORMAL /HPF
BASOPHILS # BLD AUTO: 0.1 10E3/UL (ref 0–0.2)
BASOPHILS NFR BLD AUTO: 1 %
BILIRUB SERPL-MCNC: 0.4 MG/DL
BILIRUB UR QL STRIP: NEGATIVE
BUN SERPL-MCNC: 8.9 MG/DL (ref 8–23)
CALCIUM SERPL-MCNC: 9.2 MG/DL (ref 8.8–10.4)
CHLORIDE SERPL-SCNC: 100 MMOL/L (ref 98–107)
COLOR UR AUTO: YELLOW
CREAT SERPL-MCNC: 0.84 MG/DL (ref 0.51–0.95)
CRP SERPL-MCNC: 173.72 MG/L
EGFRCR SERPLBLD CKD-EPI 2021: 69 ML/MIN/1.73M2
EOSINOPHIL # BLD AUTO: 0.1 10E3/UL (ref 0–0.7)
EOSINOPHIL NFR BLD AUTO: 2 %
ERYTHROCYTE [DISTWIDTH] IN BLOOD BY AUTOMATED COUNT: 17.7 % (ref 10–15)
GLUCOSE SERPL-MCNC: 109 MG/DL (ref 70–99)
GLUCOSE UR STRIP-MCNC: NEGATIVE MG/DL
HCO3 SERPL-SCNC: 22 MMOL/L (ref 22–29)
HCT VFR BLD AUTO: 43 % (ref 35–47)
HGB BLD-MCNC: 14.3 G/DL (ref 11.7–15.7)
HGB UR QL STRIP: ABNORMAL
HOLD SPECIMEN: NORMAL
HOLD SPECIMEN: NORMAL
IMM GRANULOCYTES # BLD: 0 10E3/UL
IMM GRANULOCYTES NFR BLD: 0 %
KETONES UR STRIP-MCNC: 20 MG/DL
LEUKOCYTE ESTERASE UR QL STRIP: ABNORMAL
LIPASE SERPL-CCNC: 34 U/L (ref 13–60)
LYMPHOCYTES # BLD AUTO: 0.8 10E3/UL (ref 0.8–5.3)
LYMPHOCYTES NFR BLD AUTO: 11 %
MCH RBC QN AUTO: 27.6 PG (ref 26.5–33)
MCHC RBC AUTO-ENTMCNC: 33.3 G/DL (ref 31.5–36.5)
MCV RBC AUTO: 83 FL (ref 78–100)
MONOCYTES # BLD AUTO: 0.8 10E3/UL (ref 0–1.3)
MONOCYTES NFR BLD AUTO: 11 %
MUCOUS THREADS #/AREA URNS LPF: PRESENT /LPF
NEUTROPHILS # BLD AUTO: 5.5 10E3/UL (ref 1.6–8.3)
NEUTROPHILS NFR BLD AUTO: 75 %
NITRATE UR QL: NEGATIVE
NRBC # BLD AUTO: 0 10E3/UL
NRBC BLD AUTO-RTO: 0 /100
PH UR STRIP: 6 [PH] (ref 4.7–8)
PLATELET # BLD AUTO: 182 10E3/UL (ref 150–450)
POTASSIUM SERPL-SCNC: 4.3 MMOL/L (ref 3.4–5.3)
PROT SERPL-MCNC: 7 G/DL (ref 6.4–8.3)
RBC # BLD AUTO: 5.19 10E6/UL (ref 3.8–5.2)
RBC URINE: 10 /HPF
SODIUM SERPL-SCNC: 136 MMOL/L (ref 135–145)
SP GR UR STRIP: 1.02 (ref 1–1.03)
SQUAMOUS EPITHELIAL: 44 /HPF
UROBILINOGEN UR STRIP-MCNC: 2 MG/DL
WBC # BLD AUTO: 7.3 10E3/UL (ref 4–11)
WBC URINE: 76 /HPF

## 2025-07-15 PROCEDURE — 250N000011 HC RX IP 250 OP 636: Performed by: FAMILY MEDICINE

## 2025-07-15 PROCEDURE — 86140 C-REACTIVE PROTEIN: CPT | Performed by: FAMILY MEDICINE

## 2025-07-15 PROCEDURE — 99284 EMERGENCY DEPT VISIT MOD MDM: CPT | Performed by: FAMILY MEDICINE

## 2025-07-15 PROCEDURE — 81001 URINALYSIS AUTO W/SCOPE: CPT | Performed by: FAMILY MEDICINE

## 2025-07-15 PROCEDURE — 96365 THER/PROPH/DIAG IV INF INIT: CPT

## 2025-07-15 PROCEDURE — 83690 ASSAY OF LIPASE: CPT | Performed by: FAMILY MEDICINE

## 2025-07-15 PROCEDURE — 99284 EMERGENCY DEPT VISIT MOD MDM: CPT | Mod: 25 | Performed by: FAMILY MEDICINE

## 2025-07-15 PROCEDURE — 85025 COMPLETE CBC W/AUTO DIFF WBC: CPT | Performed by: FAMILY MEDICINE

## 2025-07-15 PROCEDURE — 258N000003 HC RX IP 258 OP 636: Performed by: FAMILY MEDICINE

## 2025-07-15 PROCEDURE — 82310 ASSAY OF CALCIUM: CPT | Performed by: FAMILY MEDICINE

## 2025-07-15 PROCEDURE — 36415 COLL VENOUS BLD VENIPUNCTURE: CPT | Performed by: FAMILY MEDICINE

## 2025-07-15 RX ORDER — CEPHALEXIN 500 MG/1
500 CAPSULE ORAL 4 TIMES DAILY
Qty: 28 CAPSULE | Refills: 0 | Status: SHIPPED | OUTPATIENT
Start: 2025-07-15 | End: 2025-07-22

## 2025-07-15 RX ORDER — CEFTRIAXONE 2 G/50ML
2 INJECTION, SOLUTION INTRAVENOUS ONCE
Status: COMPLETED | OUTPATIENT
Start: 2025-07-15 | End: 2025-07-15

## 2025-07-15 RX ADMIN — CEFTRIAXONE 2 G: 2 INJECTION, SOLUTION INTRAVENOUS at 11:12

## 2025-07-15 RX ADMIN — SODIUM CHLORIDE 1000 ML: 9 INJECTION, SOLUTION INTRAVENOUS at 11:06

## 2025-07-15 ASSESSMENT — ENCOUNTER SYMPTOMS
MYALGIAS: 0
SHORTNESS OF BREATH: 0
DIZZINESS: 0
HEMATURIA: 0
NAUSEA: 0
DYSURIA: 0
CHILLS: 1
CONSTIPATION: 1
ARTHRALGIAS: 0
COUGH: 0
HEADACHES: 0
FATIGUE: 1
FEVER: 1
ABDOMINAL PAIN: 1
VOMITING: 0
DIARRHEA: 0
ACTIVITY CHANGE: 0
EYE REDNESS: 0
NECK STIFFNESS: 0
SORE THROAT: 0
APPETITE CHANGE: 0
RHINORRHEA: 0

## 2025-07-15 ASSESSMENT — ACTIVITIES OF DAILY LIVING (ADL)
ADLS_ACUITY_SCORE: 43

## 2025-07-15 NOTE — ED TRIAGE NOTES
"Patient presents with c/o temp since Sunday, reports constipation- causing lower abdominal pain which also started Sunday. Reports \"hard jordi\" Unsure of when her last \"decent\" BM was. Reports taking stool softeners. Reports decreased appetite. Reports urinary frequency, denies any blood in urine or stool. Also has headache.         "

## 2025-07-15 NOTE — ED PROVIDER NOTES
"  History     Chief Complaint   Patient presents with    Abdominal Pain     The patient is a 80 year old female who present to the ED in [place] with a problem list significant for previous GI bleed, anemia, pancreatic mass, and ovarian cyst.  She had a fever at home starting Sunday that improved with ibuprofen.      Patient presents with c/o temp since Sunday, reports constipation- causing lower abdominal pain which also started Sunday. Reports \"hard jordi\" Unsure of when her last \"decent\" BM was. Reports taking stool softeners. Reports decreased appetite. Reports urinary frequency, denies any blood in urine or stool. Also has headache.     The history is provided by the patient, the spouse and medical records.   Abdominal Pain  Pain location:  Suprapubic and periumbilical  Pain quality: aching, dull and sharp    Pain radiates to:  Does not radiate  Pain severity:  Moderate  Onset quality:  Sudden  Duration:  2 days  Timing:  Constant  Progression:  Waxing and waning  Chronicity:  New  Context: alcohol use    Relieved by:  Nothing  Worsened by:  Palpation  Associated symptoms: chills, constipation (pebble like), fatigue and fever    Associated symptoms: no chest pain, no cough, no diarrhea, no dysuria, no hematuria, no nausea, no shortness of breath, no sore throat and no vomiting    Fever:     Duration:  2 days    Timing:  Intermittent    Max temp PTA:  101    Temp source:  Oral    Progression:  Resolved        Allergies:  No Known Allergies    Problem List:    Patient Active Problem List    Diagnosis Date Noted    Acute blood loss anemia 04/18/2025     Priority: Medium    Right ovarian cyst 04/18/2025     Priority: Medium    Seborrheic keratosis 07/12/2011     Priority: Medium    Nicotine addiction 11/29/2010     Priority: Medium        Past Medical History:    Past Medical History:   Diagnosis Date    Closed fracture of skull ([facility])     GI bleed 2025       Past Surgical History:    Past Surgical History: "   Procedure Laterality Date    ARTHROSCOPY KNEE Right 06/18/2014    meniscus tear    ARTHROSCOPY SHOULDER ROTATOR CUFF REPAIR Right 03/2012    arthroscopy, rotator cuff repair, SLAP lesion    EXTRACAPSULAR CATARACT EXTRATION WITH INTRAOCULAR LENS IMPLANT  2010    planned left cataract removal, Dr. Black    PHACOEMULSIFICATION WITH STANDARD INTRAOCULAR LENS IMPLANT Right 2/24/2020    Procedure: PHACOEMULSIFICATION CATARACT EXTRACTION POSTERIOR CHAMBER LENS RIGHT EYE LI61AO 17.5;  Surgeon: Jorge Alberto Black MD;  Location: [facility] OR    WRIST SURGERY Right 07/2011    Right wrist cystectomy - Dr Aguilar       Family History:    Family History   Problem Relation Age of Onset    Pancreatic Cancer Mother         Cancer-pancreatic    Other - See Comments Father         AAA/high blood pressure    Cancer Father         Cancer,skin cancer on his nose    Heart Disease Brother         Heart Disease,afib       Social History:  Marital Status:   [2]  Social History     Tobacco Use    Smoking status: Every Day     Current packs/day: 1.00     Types: Cigarettes     Passive exposure: Never    Smokeless tobacco: Never   Vaping Use    Vaping status: Never Used   Substance Use Topics    Alcohol use: Yes     Alcohol/week: 14.0 standard drinks of alcohol     Types: 14 Cans of beer per week    Drug use: Never        Medications:    Ibuprofen-diphenhydrAMINE Cit (ADVIL PM PO)  multivitamin (CENTRUM SILVER) tablet          Review of Systems   Constitutional:  Positive for chills, fatigue and fever. Negative for activity change and appetite change.   HENT:  Negative for congestion, rhinorrhea and sore throat.    Eyes:  Negative for redness.   Respiratory:  Negative for cough and shortness of breath.    Cardiovascular:  Negative for chest pain.   Gastrointestinal:  Positive for abdominal pain and constipation (pebble like). Negative for diarrhea, nausea and vomiting.   Genitourinary:  Negative for dysuria and hematuria.   Musculoskeletal:   Negative for arthralgias, myalgias and neck stiffness.   Skin:  Negative for rash.   Neurological:  Negative for dizziness and headaches.       Physical Exam   BP: (!) 149/81  Pulse: 85  Temp: 98.9  F (37.2  C)  Resp: 15  Weight: 67.1 kg (148 lb 0.6 oz)  SpO2: 95 %      Physical Exam  Vitals and nursing note reviewed. Exam conducted with a chaperone present.   Constitutional:       General: She is not in acute distress.     Appearance: Normal appearance. She is well-developed. She is not diaphoretic.   HENT:      Head: Atraumatic.      Mouth/Throat:      Mouth: Mucous membranes are moist.   Eyes:      General: No scleral icterus.     Conjunctiva/sclera: Conjunctivae normal.   Cardiovascular:      Rate and Rhythm: Normal rate and regular rhythm.      Heart sounds: Normal heart sounds.   Pulmonary:      Effort: No respiratory distress.      Breath sounds: Normal breath sounds.   Abdominal:      General: Abdomen is flat.      Tenderness: There is abdominal tenderness in the right upper quadrant, right lower quadrant, suprapubic area and left lower quadrant. There is guarding. There is no rebound. Negative signs include Gomes's sign, Rovsing's sign and McBurney's sign.   Musculoskeletal:      Cervical back: Neck supple.   Skin:     General: Skin is warm.      Findings: No rash.   Neurological:      Mental Status: She is alert.         ED Course        Procedures                  No results found for this or any previous visit (from the past 24 hours).    Medications - No data to display    Assessments & Plan (with Medical Decision Making)     I have reviewed the nursing notes.    I have reviewed the findings, diagnosis, plan and need for follow up with the patient.  The patient is an 80-year-old female who presents to the emergency department with suprapubic abdominal pain without dysuria.  Laboratory evaluation is unremarkable with the exception of a likely contaminated urine.  Unfortunately, there is no other good  explanation for her pain.  She has previously had a GI bleed but has not had problems since.  Our plan is to treat this like a urinary tract infection and wait for the culture to return.  She is not requiring pain medications.  She denies fevers, chills, or shortness of breath.  We will plan to have her follow-up with her primary medical provider in about 7 to 10 days for reevaluation.        Medical Decision Making  Differential diagnosis: UTI, cystitis, bladder stones, urethritis, urinary retention, interstitial cystitis, dysmenorrhea, endometriosis, pelvic inflammatory disease, ovarian torsion or cyst, or uterine fibroids.  Constipation, IBS, diverticulitis, cardiac, appendicitis, bowel obstruction, dysfunction, trauma, or referred pain.          New Prescriptions    No medications on file       Final diagnoses:   Acute cystitis with hematuria   Suprapubic abdominal pain       7/15/2025   [facility]       Rolly Tompkins MD  07/15/25 [id number]

## 2025-07-19 ENCOUNTER — HEALTH MAINTENANCE LETTER (OUTPATIENT)
Age: 80
End: 2025-07-19

## 2025-08-11 ENCOUNTER — ALLIED HEALTH/NURSE VISIT (OUTPATIENT)
Dept: FAMILY MEDICINE | Facility: OTHER | Age: 80
End: 2025-08-11
Attending: STUDENT IN AN ORGANIZED HEALTH CARE EDUCATION/TRAINING PROGRAM
Payer: COMMERCIAL

## 2025-08-11 DIAGNOSIS — E53.8 VITAMIN B12 DEFICIENCY (NON ANEMIC): Primary | ICD-10-CM

## 2025-08-11 PROCEDURE — 250N000011 HC RX IP 250 OP 636: Performed by: STUDENT IN AN ORGANIZED HEALTH CARE EDUCATION/TRAINING PROGRAM

## 2025-08-11 PROCEDURE — 96372 THER/PROPH/DIAG INJ SC/IM: CPT | Performed by: STUDENT IN AN ORGANIZED HEALTH CARE EDUCATION/TRAINING PROGRAM

## 2025-08-11 RX ADMIN — CYANOCOBALAMIN 1000 MCG: 1000 INJECTION, SOLUTION INTRAMUSCULAR; SUBCUTANEOUS at 14:06

## (undated) DEVICE — LENS DELIVERY SYSTEM-SOFPORT LI61AO (EZ-28)

## (undated) DEVICE — HANDPIECE-CAPSULEGUARD I/A STELLARIS

## (undated) DEVICE — CYSTOTOME-IRRIGATING  25G

## (undated) DEVICE — PACK-PHACO STELLARIS

## (undated) DEVICE — BIN-LENS IMPLANT CART

## (undated) DEVICE — CANNULA-NUCLEUS HYDRODISSECTOR

## (undated) DEVICE — BETADINE 5% STERILE OPHTHALMIC SOLUTION 1 OZ.

## (undated) DEVICE — BIN-CATARACT BIN

## (undated) DEVICE — CANNULA-VISCOFLOW 25G 9MM 45 DEGREE ANGLE

## (undated) DEVICE — KNIFE-KERATOME SLIT 2.8MM

## (undated) DEVICE — KNIFE-MICRO UNITOME 5.0MM

## (undated) DEVICE — BIN-TECNIS DCB00 LENSES

## (undated) DEVICE — INSTRUMENT WIPE-VISIWIPE

## (undated) DEVICE — PACK-EYE-CUSTOM

## (undated) DEVICE — CANNULA-VISCOFLOW 30G 9MM BEND

## (undated) DEVICE — GLV-7.5 PROTEXIS PI CLASSIC LF/PF

## (undated) DEVICE — GLV-7.0 PROTEXIS PI CLASSIC LF/PF

## (undated) RX ORDER — CYANOCOBALAMIN 1000 UG/ML
INJECTION, SOLUTION INTRAMUSCULAR; SUBCUTANEOUS
Status: DISPENSED
Start: 2025-08-11

## (undated) RX ORDER — CYANOCOBALAMIN 1000 UG/ML
INJECTION, SOLUTION INTRAMUSCULAR; SUBCUTANEOUS
Status: DISPENSED
Start: 2025-06-11